# Patient Record
Sex: MALE | Race: OTHER | NOT HISPANIC OR LATINO | Employment: OTHER | URBAN - METROPOLITAN AREA
[De-identification: names, ages, dates, MRNs, and addresses within clinical notes are randomized per-mention and may not be internally consistent; named-entity substitution may affect disease eponyms.]

---

## 2017-06-23 DIAGNOSIS — I71.20 THORACIC AORTIC ANEURYSM WITHOUT RUPTURE (HCC): Primary | ICD-10-CM

## 2017-07-11 ENCOUNTER — HOSPITAL ENCOUNTER (OUTPATIENT)
Dept: CT IMAGING | Facility: HOSPITAL | Age: 62
Discharge: HOME | End: 2017-07-11
Admitting: PHYSICIAN ASSISTANT

## 2017-07-11 DIAGNOSIS — I71.20 THORACIC AORTIC ANEURYSM WITHOUT RUPTURE (HCC): ICD-10-CM

## 2017-07-11 LAB — CREAT BLDA-MCNC: 0.8 MG/DL (ref 0.6–1.3)

## 2017-07-11 PROCEDURE — 82565 ASSAY OF CREATININE: CPT

## 2017-07-11 PROCEDURE — 0 IOPAMIDOL PER 1 ML: Performed by: PHYSICIAN ASSISTANT

## 2017-07-11 PROCEDURE — 71270 CT THORAX DX C-/C+: CPT

## 2017-07-11 RX ADMIN — IOPAMIDOL 85 ML: 755 INJECTION, SOLUTION INTRAVENOUS at 10:35

## 2017-08-17 ENCOUNTER — OFFICE VISIT (OUTPATIENT)
Dept: CARDIAC SURGERY | Facility: CLINIC | Age: 62
End: 2017-08-17

## 2017-08-17 DIAGNOSIS — I71.20 THORACIC AORTIC ANEURYSM WITHOUT RUPTURE (HCC): Primary | ICD-10-CM

## 2017-08-17 PROCEDURE — 99212 OFFICE O/P EST SF 10 MIN: CPT | Performed by: THORACIC SURGERY (CARDIOTHORACIC VASCULAR SURGERY)

## 2017-08-18 VITALS
DIASTOLIC BLOOD PRESSURE: 70 MMHG | SYSTOLIC BLOOD PRESSURE: 119 MMHG | BODY MASS INDEX: 29.52 KG/M2 | HEIGHT: 74 IN | WEIGHT: 230 LBS

## 2017-08-18 RX ORDER — GABAPENTIN 800 MG/1
TABLET ORAL
Refills: 2 | COMMUNITY
Start: 2017-07-03 | End: 2021-06-24 | Stop reason: SDUPTHER

## 2017-08-18 RX ORDER — CICLESONIDE 50 UG/1
SPRAY NASAL
COMMUNITY
Start: 2017-08-02 | End: 2021-06-24

## 2017-08-18 RX ORDER — DILTIAZEM HYDROCHLORIDE 60 MG/1
1 TABLET, FILM COATED ORAL 2 TIMES DAILY
Status: ON HOLD | COMMUNITY
Start: 2017-08-02 | End: 2021-08-22

## 2017-08-18 NOTE — PROGRESS NOTES
08/17/2017  Patient Information  Wong Alicea                                                                                          [address]   [address] [address]   1955  'PCP/Referring Physician'  Juliann Aguilar, APRN  [phone]  No ref. provider found    Chief Complaint   Patient presents with   • Follow-up     1yr f/u w/ CT Chest       History of Present Illness:  Mr. Alicea returns today for follow up of his thoracic aneurysm.  He has been feeling well since last visit.  He has had no new problems.  He denies any chest pain or discomfort.    Patient Active Problem List   Diagnosis   • Thoracic aortic aneurysm without rupture     Past Medical History:   Diagnosis Date   • Aneurysm    • Arthritis    • Cataract    • Depression    • GERD (gastroesophageal reflux disease)    • Hypertension    • Renal cyst    • Thoracic aortic aneurysm      Past Surgical History:   Procedure Laterality Date   • CARPAL TUNNEL RELEASE     • CATARACT EXTRACTION     • CHOLECYSTECTOMY     • HAND SURGERY     • REPLACEMENT TOTAL KNEE     • SHOULDER SURGERY     • VEIN LACERATION REPAIR      OPEN LEFT KNEE SURGERY FOR VEIN REPAIR       Current Outpatient Prescriptions:   •  aspirin 81 MG tablet, Take  by mouth., Disp: , Rfl:   •  Budesonide-Formoterol Fumarate (SYMBICORT IN), Inhale., Disp: , Rfl:   •  clonazePAM (KlonoPIN) 1 MG tablet, Take  by mouth., Disp: , Rfl:   •  esomeprazole (NEXIUM) 40 MG capsule, Take  by mouth., Disp: , Rfl:   •  gabapentin (NEURONTIN) 800 MG tablet, , Disp: , Rfl: 2  •  levocetirizine (XYZAL) 5 MG tablet, Take  by mouth., Disp: , Rfl:   •  lisinopril (PRINIVIL,ZESTRIL) 10 MG tablet, Take 10 mg by mouth daily., Disp: , Rfl:   •  montelukast (SINGULAIR) 10 MG tablet, Take  by mouth., Disp: , Rfl:   •  OMNARIS 50 MCG/ACT nasal spray, , Disp: , Rfl:   •  SYMBICORT 80-4.5 MCG/ACT inhaler, , Disp: , Rfl:   •  tamsulosin (FLOMAX) 0.4 MG capsule 24 hr capsule, Take  by mouth., Disp: , Rfl:   •  gabapentin  (NEURONTIN) 400 MG capsule, Take  by mouth., Disp: , Rfl:   Allergies   Allergen Reactions   • Codeine    • Erythromycin    • Niacin    • Other    • Wheat Bran      Social History     Social History   • Marital status:      Spouse name: N/A   • Number of children: 3   • Years of education: N/A     Occupational History   •  Disabled     Social History Main Topics   • Smoking status: Former Smoker     Packs/day: 0.50     Years: 20.00     Types: Cigarettes     Quit date: 7/8/1998   • Smokeless tobacco: Former User   • Alcohol use No   • Drug use: No   • Sexual activity: Not on file     Other Topics Concern   • Not on file     Social History Narrative    Lives in [place], [place] with spouse     Family History   Problem Relation Age of Onset   • Heart failure Mother    • Coronary artery disease Father    • Lung disease Father      Review of Systems   Constitution: Positive for malaise/fatigue. Negative for chills, fever, night sweats and weight loss.   HENT: Positive for congestion and hearing loss. Negative for headaches, odynophagia and sore throat.    Cardiovascular: Positive for chest pain, dyspnea on exertion and palpitations. Negative for leg swelling and orthopnea.   Respiratory: Positive for shortness of breath. Negative for cough and hemoptysis.    Endocrine: Negative for cold intolerance, heat intolerance, polydipsia, polyphagia and polyuria.   Hematologic/Lymphatic: Does not bruise/bleed easily.   Skin: Negative for itching and rash.   Musculoskeletal: Positive for back pain, joint pain and muscle cramps. Negative for joint swelling and myalgias.   Gastrointestinal: Negative for abdominal pain, constipation, diarrhea, hematemesis, hematochezia, melena, nausea and vomiting.   Genitourinary: Negative for dysuria, frequency and hematuria.   Neurological: Positive for dizziness. Negative for focal weakness, numbness and seizures.   Psychiatric/Behavioral: Positive for depression. Negative for suicidal  "ideas.   All other systems reviewed and are negative.    Vitals:    08/18/17 0904   BP: 119/70   BP Location: Left arm   Patient Position: Sitting   Weight: 230 lb (104 kg)   Height: 74\" (188 cm)      Physical Exam  NECK:   No masses.  LUNGS:  Clear.  CARDIOVASCULAR: Regular rhythm and rate.  No carotid bruits.    Labs/Imaging:  His CT scan reveals his chest to be stable.    Assessment/Plan:  Mr. Alicea's thoracic aortic aneurysm appears stable by CT scan.  I have discussed the CT scan results with him.  We will see Mr. Alicea back in follow up in two years with another CT scan.      Patient Active Problem List   Diagnosis   • Thoracic aortic aneurysm without rupture     Signed by: Sher Martin M.D.    8/17/17    CC:  NICHELLE Nunez transcribing on behalf of Sher Martin MD dictating.   "

## 2019-08-29 ENCOUNTER — TELEPHONE (OUTPATIENT)
Dept: CARDIAC SURGERY | Facility: CLINIC | Age: 64
End: 2019-08-29

## 2019-12-19 ENCOUNTER — OFFICE VISIT (OUTPATIENT)
Dept: CARDIAC SURGERY | Facility: CLINIC | Age: 64
End: 2019-12-19

## 2019-12-19 DIAGNOSIS — I71.20 THORACIC AORTIC ANEURYSM WITHOUT RUPTURE (HCC): Primary | ICD-10-CM

## 2019-12-19 PROCEDURE — 99212 OFFICE O/P EST SF 10 MIN: CPT | Performed by: THORACIC SURGERY (CARDIOTHORACIC VASCULAR SURGERY)

## 2019-12-20 VITALS
DIASTOLIC BLOOD PRESSURE: 90 MMHG | WEIGHT: 228 LBS | BODY MASS INDEX: 29.26 KG/M2 | HEIGHT: 74 IN | OXYGEN SATURATION: 99 % | HEART RATE: 66 BPM | SYSTOLIC BLOOD PRESSURE: 138 MMHG

## 2019-12-20 RX ORDER — ROSUVASTATIN CALCIUM 20 MG/1
20 TABLET, COATED ORAL DAILY
Status: ON HOLD | COMMUNITY
Start: 2019-09-18 | End: 2021-08-22

## 2019-12-20 RX ORDER — TIZANIDINE 4 MG/1
4 TABLET ORAL 2 TIMES DAILY
COMMUNITY
Start: 2019-11-18

## 2019-12-20 RX ORDER — LIDOCAINE 50 MG/G
1 PATCH TOPICAL AS NEEDED
COMMUNITY
Start: 2019-11-12

## 2019-12-20 RX ORDER — METOPROLOL SUCCINATE 25 MG/1
25 TABLET, EXTENDED RELEASE ORAL DAILY
COMMUNITY
Start: 2019-11-18

## 2019-12-20 RX ORDER — SILDENAFIL 100 MG/1
100 TABLET, FILM COATED ORAL DAILY PRN
Status: ON HOLD | COMMUNITY
Start: 2019-09-18 | End: 2021-08-22

## 2019-12-20 RX ORDER — ERGOCALCIFEROL 1.25 MG/1
50000 CAPSULE ORAL WEEKLY
COMMUNITY
Start: 2019-11-18

## 2019-12-20 RX ORDER — PREGABALIN 100 MG/1
CAPSULE ORAL 3 TIMES DAILY
COMMUNITY
Start: 2019-12-10 | End: 2021-08-09

## 2019-12-20 RX ORDER — BUPROPION HYDROCHLORIDE 150 MG/1
150 TABLET ORAL DAILY
COMMUNITY
Start: 2019-11-18

## 2019-12-20 RX ORDER — ROPINIROLE 1 MG/1
1 TABLET, FILM COATED ORAL NIGHTLY
COMMUNITY
End: 2021-06-24

## 2019-12-20 NOTE — PROGRESS NOTES
12/19/2019  Patient Information  Wong Alicea                                                                                          [address]   [address] [address]   1955  'PCP/Referring Physician'  Juliann Aguilar, APRN  [phone]  No ref. provider found    Chief Complaint   Patient presents with   • Follow-up     2 YR FU with CT Chest-Ascending Aneursym       History of Present Illness:  Mr. Alicea returns today for follow up of his ascending thoracic aneurysm.  Since last visit, he has been doing approximately the same.  He had a CT done which reveals a lot of interstitial lung disease and plaque.  He has an ascending aneurysm that is approximately 4.4 cm.  He denies any chest pain or discomfort.  He does have some shortness of breath.  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    Patient Active Problem List   Diagnosis   • Thoracic aortic aneurysm without rupture (CMS/HCC)     Past Medical History:   Diagnosis Date   • Aneurysm (CMS/HCC)    • Arthritis    • Cataract    • Depression    • GERD (gastroesophageal reflux disease)    • Hypertension    • Renal cyst    • Thoracic aortic aneurysm (CMS/HCC)      Past Surgical History:   Procedure Laterality Date   • CARPAL TUNNEL RELEASE     • CATARACT EXTRACTION     • CHOLECYSTECTOMY     • HAND SURGERY     • REPLACEMENT TOTAL KNEE     • SHOULDER SURGERY     • VEIN LACERATION REPAIR      OPEN LEFT KNEE SURGERY FOR VEIN REPAIR       Current Outpatient Medications:   •  aspirin 81 MG tablet, Take  by mouth., Disp: , Rfl:   •  buPROPion XL (WELLBUTRIN XL) 150 MG 24 hr tablet, Daily., Disp: , Rfl:   •  diclofenac (VOLTAREN) 1 % gel gel, Apply 4 g topically to the appropriate area as directed Every 12 (Twelve) Hours As Needed., Disp: , Rfl:   •  levocetirizine (XYZAL) 5 MG tablet, Take  by mouth Daily., Disp: , Rfl:   •  lidocaine (LIDODERM) 5 %, As Needed., Disp: , Rfl:   •  metoprolol succinate XL (TOPROL-XL) 25 MG 24 hr tablet, Daily., Disp: , Rfl:   •   montelukast (SINGULAIR) 10 MG tablet, Take  by mouth Every Night., Disp: , Rfl:   •  OMNARIS 50 MCG/ACT nasal spray, , Disp: , Rfl:   •  pregabalin (LYRICA) 100 MG capsule, 3 (Three) Times a Day., Disp: , Rfl:   •  rOPINIRole (REQUIP) 1 MG tablet, Take 1 mg by mouth Every Night. Take 1 hour before bedtime., Disp: , Rfl:   •  rosuvastatin (CRESTOR) 20 MG tablet, Take 20 mg by mouth Daily., Disp: , Rfl:   •  sildenafil (VIAGRA) 100 MG tablet, Take 100 mg by mouth Daily As Needed., Disp: , Rfl:   •  SYMBICORT 80-4.5 MCG/ACT inhaler, Daily., Disp: , Rfl:   •  tiZANidine (ZANAFLEX) 4 MG tablet, 4 (Four) Times a Day., Disp: , Rfl:   •  vitamin D (ERGOCALCIFEROL) 1.25 MG (44734 UT) capsule capsule, 1 (One) Time Per Week., Disp: , Rfl:   •  Budesonide-Formoterol Fumarate (SYMBICORT IN), Inhale., Disp: , Rfl:   •  clonazePAM (KlonoPIN) 1 MG tablet, Take  by mouth., Disp: , Rfl:   •  esomeprazole (NEXIUM) 40 MG capsule, Take  by mouth., Disp: , Rfl:   •  gabapentin (NEURONTIN) 400 MG capsule, Take  by mouth., Disp: , Rfl:   •  gabapentin (NEURONTIN) 800 MG tablet, , Disp: , Rfl: 2  •  lisinopril (PRINIVIL,ZESTRIL) 10 MG tablet, Take 10 mg by mouth daily., Disp: , Rfl:   •  tamsulosin (FLOMAX) 0.4 MG capsule 24 hr capsule, Take  by mouth., Disp: , Rfl:   Allergies   Allergen Reactions   • Other    • Wheat Bran    • Codeine Anxiety   • Erythromycin Rash   • Niacin Rash   • Sulfa Antibiotics Rash     Social History     Socioeconomic History   • Marital status:      Spouse name: Not on file   • Number of children: 3   • Years of education: Not on file   • Highest education level: Not on file   Occupational History   • Occupation:      Employer: DISABLED     Comment: Joint [employer]   Tobacco Use   • Smoking status: Former Smoker     Packs/day: 0.50     Years: 20.00     Pack years: 10.00     Types: Cigarettes     Last attempt to quit: 1998     Years since quittin.4   • Smokeless tobacco: Former User      "Types: Chew     Quit date: 1998   Substance and Sexual Activity   • Alcohol use: No   • Drug use: No   Social History Narrative    Lives in [place], [place] with spouse     Family History   Problem Relation Age of Onset   • Heart failure Mother    • Coronary artery disease Father    • Lung disease Father      Review of Systems   Constitution: Positive for night sweats and weight loss (by diet). Negative for chills, fever and malaise/fatigue.   HENT: Negative for hearing loss, odynophagia and sore throat.    Cardiovascular: Positive for chest pain, claudication (Bilateral), dyspnea on exertion and leg swelling. Negative for orthopnea and palpitations.   Respiratory: Positive for shortness of breath and wheezing. Negative for cough and hemoptysis.    Endocrine: Negative for cold intolerance, heat intolerance, polydipsia, polyphagia and polyuria.   Hematologic/Lymphatic: Bruises/bleeds easily.   Skin: Negative for itching and rash.   Musculoskeletal: Positive for back pain and joint pain. Negative for joint swelling and myalgias.   Gastrointestinal: Positive for hematochezia and hemorrhoids. Negative for abdominal pain, constipation, diarrhea, hematemesis, melena, nausea and vomiting.   Genitourinary: Negative for dysuria, frequency and hematuria.   Neurological: Positive for dizziness and light-headedness. Negative for focal weakness, headaches, numbness and seizures.   Psychiatric/Behavioral: Negative for suicidal ideas.   All other systems reviewed and are negative.    Vitals:    12/19/19 0856   BP: 138/90   BP Location: Left arm   Patient Position: Sitting   Pulse: 66   SpO2: 99%   Weight: 103 kg (228 lb)   Height: 188 cm (74\")      Physical Exam  NECK:    No masses.  LUNGS:   Decreased in the bases.  CARDIOVASCULAR:  Regular rhythm and rate.  GI:    Abdomen is soft.    Labs/Imaging:  I have obtained and reviewed the CT scan.    Assessment/Plan:  Mr. Alicea returns for follow up of his ascending thoracic aneurysm.  At this " point, I think that he probably has coal workers' pneumoconiosis from his mine work.  He also has an aneurysm which appears to be stable.  He has no chest pain.  I emphasized the importance of good blood pressure control.  He is not smoking.  I will see him back in one year with a CT scan.    Patient Active Problem List   Diagnosis   • Thoracic aortic aneurysm without rupture (CMS/[facility])     CC: NICHELLE Nunez transcribing on behalf of Sher Martin MD dictating.

## 2021-03-23 DIAGNOSIS — I71.20 THORACIC AORTIC ANEURYSM WITHOUT RUPTURE (HCC): Primary | ICD-10-CM

## 2021-04-12 ENCOUNTER — TELEPHONE (OUTPATIENT)
Dept: CARDIAC SURGERY | Facility: CLINIC | Age: 66
End: 2021-04-12

## 2021-04-12 NOTE — TELEPHONE ENCOUNTER
This pt called to f/u on his test as he has not heard anything about when it is scheduled. He will be in [place] on 5/12 for another doctor's appointment and would like his test to be here at [facility] on that day if possible. I told him you would send the order to central scheduling and they will be calling him to set up a time.

## 2021-05-12 ENCOUNTER — TRANSCRIBE ORDERS (OUTPATIENT)
Dept: ADMINISTRATIVE | Facility: HOSPITAL | Age: 66
End: 2021-05-12

## 2021-05-12 DIAGNOSIS — K21.9 CHRONIC GERD: Primary | ICD-10-CM

## 2021-06-09 ENCOUNTER — APPOINTMENT (OUTPATIENT)
Dept: PREADMISSION TESTING | Facility: HOSPITAL | Age: 66
End: 2021-06-09

## 2021-06-11 PROCEDURE — 88305 TISSUE EXAM BY PATHOLOGIST: CPT | Performed by: SURGERY

## 2021-06-14 ENCOUNTER — LAB REQUISITION (OUTPATIENT)
Dept: LAB | Facility: HOSPITAL | Age: 66
End: 2021-06-14

## 2021-06-14 DIAGNOSIS — K21.9 GASTRO-ESOPHAGEAL REFLUX DISEASE WITHOUT ESOPHAGITIS: ICD-10-CM

## 2021-06-15 ENCOUNTER — APPOINTMENT (OUTPATIENT)
Dept: PREADMISSION TESTING | Facility: HOSPITAL | Age: 66
End: 2021-06-15

## 2021-06-15 LAB
CYTO UR: NORMAL
LAB AP CASE REPORT: NORMAL
LAB AP CLINICAL INFORMATION: NORMAL
PATH REPORT.FINAL DX SPEC: NORMAL
PATH REPORT.GROSS SPEC: NORMAL

## 2021-06-16 DIAGNOSIS — Z00.6 EXAMINATION FOR NORMAL COMPARISON FOR CLINICAL RESEARCH: Primary | ICD-10-CM

## 2021-06-17 ENCOUNTER — APPOINTMENT (OUTPATIENT)
Dept: GENERAL RADIOLOGY | Facility: HOSPITAL | Age: 66
End: 2021-06-17

## 2021-06-18 ENCOUNTER — HOSPITAL ENCOUNTER (OUTPATIENT)
Facility: HOSPITAL | Age: 66
Setting detail: HOSPITAL OUTPATIENT SURGERY
Discharge: HOME | End: 2021-06-18
Attending: SURGERY | Admitting: SURGERY

## 2021-06-18 ENCOUNTER — HOSPITAL ENCOUNTER (OUTPATIENT)
Dept: GENERAL RADIOLOGY | Facility: HOSPITAL | Age: 66
Discharge: HOME | End: 2021-06-18

## 2021-06-18 DIAGNOSIS — K21.9 CHRONIC GERD: ICD-10-CM

## 2021-06-18 PROCEDURE — 74220 X-RAY XM ESOPHAGUS 1CNTRST: CPT

## 2021-06-18 PROCEDURE — 91010 ESOPHAGUS MOTILITY STUDY: CPT | Performed by: SURGERY

## 2021-06-18 PROCEDURE — 91034 GASTROESOPHAGEAL REFLUX TEST: CPT | Performed by: SURGERY

## 2021-06-18 RX ADMIN — BARIUM SULFATE 183 ML: 960 POWDER, FOR SUSPENSION ORAL at 11:37

## 2021-06-22 ENCOUNTER — TRANSCRIBE ORDERS (OUTPATIENT)
Dept: ADMINISTRATIVE | Facility: HOSPITAL | Age: 66
End: 2021-06-22

## 2021-06-22 DIAGNOSIS — K21.9 CHRONIC GERD: Primary | ICD-10-CM

## 2021-06-24 ENCOUNTER — OFFICE VISIT (OUTPATIENT)
Dept: CARDIAC SURGERY | Facility: CLINIC | Age: 66
End: 2021-06-24

## 2021-06-24 VITALS
BODY MASS INDEX: 29.95 KG/M2 | TEMPERATURE: 97.1 F | OXYGEN SATURATION: 98 % | DIASTOLIC BLOOD PRESSURE: 85 MMHG | WEIGHT: 226 LBS | HEIGHT: 73 IN | HEART RATE: 65 BPM | SYSTOLIC BLOOD PRESSURE: 124 MMHG

## 2021-06-24 DIAGNOSIS — I71.20 THORACIC AORTIC ANEURYSM WITHOUT RUPTURE (HCC): Primary | ICD-10-CM

## 2021-06-24 PROBLEM — J84.9 INTERSTITIAL LUNG DISEASE (HCC): Status: ACTIVE | Noted: 2021-06-24

## 2021-06-24 PROCEDURE — 99213 OFFICE O/P EST LOW 20 MIN: CPT | Performed by: NURSE PRACTITIONER

## 2021-06-24 RX ORDER — HYDROCHLOROTHIAZIDE 12.5 MG/1
12.5 TABLET ORAL EVERY MORNING
COMMUNITY
Start: 2021-05-25

## 2021-06-24 RX ORDER — DICLOFENAC SODIUM 75 MG/1
75 TABLET, DELAYED RELEASE ORAL 2 TIMES DAILY
COMMUNITY
Start: 2021-05-25

## 2021-06-24 RX ORDER — GABAPENTIN 600 MG/1
600 TABLET ORAL 3 TIMES DAILY
COMMUNITY
Start: 2021-05-28

## 2021-06-24 RX ORDER — FAMOTIDINE 40 MG/1
40 TABLET, FILM COATED ORAL NIGHTLY PRN
Status: ON HOLD | COMMUNITY
Start: 2021-05-25 | End: 2021-08-22

## 2021-06-24 RX ORDER — CALCIUM CARBONATE 300MG(750)
400 TABLET,CHEWABLE ORAL DAILY
COMMUNITY
Start: 2021-05-25

## 2021-06-24 RX ORDER — ROPINIROLE 5 MG/1
5 TABLET, FILM COATED ORAL 2 TIMES DAILY
COMMUNITY
Start: 2021-05-17

## 2021-06-24 RX ORDER — EZETIMIBE 10 MG/1
10 TABLET ORAL DAILY
COMMUNITY
Start: 2021-05-17

## 2021-06-24 RX ORDER — AMITRIPTYLINE HYDROCHLORIDE 25 MG/1
25 TABLET, FILM COATED ORAL
COMMUNITY
Start: 2021-05-25

## 2021-06-24 NOTE — PROGRESS NOTES
[facility] Cardiothoracic Surgery Office Follow Up Note     Date of Encounter: 06/24/2021     MRN Number: [id number]  Name: Wong Alicea  Phone Number: [phone]     Referred By: No ref. provider found  PCP: Juliann Aguilar APRN    Chief Complaint:    Chief Complaint   Patient presents with   • Follow-up     1 year follow up for an ascending aortic aneurysm.   • Thoracic Aneurysm       History of Present Illness:    Wong Alicea is a 66 y.o. male with a history of ongoing tob use, HLP, interstitial lung disease (long time ), and ascending thoracic aneurysm.  Patient presents today in follow-up of his interstitial lung disease and ascending aortic aneurysm.  Last seen in the office on 12/19/2019 with Dr. Martin.  Since last office visit, patient has been having difficulty with reflux with chest pain into between his shoulder blades.  He did undergo cardiac workup with stress testing with Dr. Cheney which was negative per pt report.  Further work-up with Dr. Mcgee reveals a hiatal hernia.  Plans for repair in the next few weeks.  He denies any unusual chest pain, back pain or flank pain.  Blood pressures have been stable.    Review of Systems:  Review of Systems   Constitutional: Negative. Negative for chills, decreased appetite, diaphoresis, fever, malaise/fatigue, night sweats and weight loss.   HENT: Positive for hoarse voice. Negative for congestion, sore throat and stridor.    Cardiovascular: Positive for dyspnea on exertion, irregular heartbeat (feels a flutter sometimes) and leg swelling. Negative for chest pain, claudication, near-syncope, orthopnea, palpitations, paroxysmal nocturnal dyspnea and syncope.   Respiratory: Positive for shortness of breath. Negative for cough, hemoptysis, sleep disturbances due to breathing, snoring, sputum production and wheezing.    Hematologic/Lymphatic: Negative for adenopathy and bleeding problem. Does not bruise/bleed easily.   Skin: Negative.   Negative for color change, dry skin, itching, poor wound healing and rash.   Musculoskeletal: Positive for back pain, falls, joint pain and muscle cramps. Negative for arthritis and muscle weakness.   Gastrointestinal: Positive for dysphagia and heartburn. Negative for abdominal pain, anorexia, constipation, diarrhea, hematochezia, melena, nausea and vomiting.   Genitourinary: Negative.    Neurological: Positive for dizziness, light-headedness and loss of balance. Negative for difficulty with concentration, disturbances in coordination, numbness, seizures, vertigo and weakness.   Psychiatric/Behavioral: Positive for depression. Negative for altered mental status, memory loss and substance abuse. The patient is nervous/anxious. The patient does not have insomnia.    Allergic/Immunologic: Negative for persistent infections.       Allergies:  Allergies   Allergen Reactions   • Other    • Codeine Anxiety   • Erythromycin Rash   • Niacin Rash   • Sulfa Antibiotics Rash   • Wheat Bran Rash       Medications:      Current Outpatient Medications:   •  amitriptyline (ELAVIL) 25 MG tablet, Take 25 mg by mouth every night at bedtime., Disp: , Rfl:   •  aspirin 81 MG tablet, Take  by mouth., Disp: , Rfl:   •  Budesonide-Formoterol Fumarate (SYMBICORT IN), Inhale., Disp: , Rfl:   •  buPROPion XL (WELLBUTRIN XL) 150 MG 24 hr tablet, Daily., Disp: , Rfl:   •  diclofenac (VOLTAREN) 75 MG EC tablet, Take 75 mg by mouth 2 (Two) Times a Day., Disp: , Rfl:   •  ezetimibe (ZETIA) 10 MG tablet, Take 10 mg by mouth Daily., Disp: , Rfl:   •  famotidine (PEPCID) 40 MG tablet, Take 40 mg by mouth every night at bedtime., Disp: , Rfl:   •  gabapentin (NEURONTIN) 600 MG tablet, Take 600 mg by mouth 3 (Three) Times a Day., Disp: , Rfl:   •  hydroCHLOROthiazide (HYDRODIURIL) 12.5 MG tablet, Take 12.5 mg by mouth Every Morning., Disp: , Rfl:   •  lidocaine (LIDODERM) 5 %, As Needed., Disp: , Rfl:   •  Magnesium 400 MG tablet, Take 400 mg by mouth  Daily., Disp: , Rfl:   •  metoprolol succinate XL (TOPROL-XL) 25 MG 24 hr tablet, Daily., Disp: , Rfl:   •  pregabalin (LYRICA) 100 MG capsule, 3 (Three) Times a Day., Disp: , Rfl:   •  rOPINIRole (REQUIP) 5 MG tablet, Take 5 mg by mouth 2 (Two) Times a Day., Disp: , Rfl:   •  rosuvastatin (CRESTOR) 20 MG tablet, Take 20 mg by mouth Daily., Disp: , Rfl:   •  sildenafil (VIAGRA) 100 MG tablet, Take 100 mg by mouth Daily As Needed., Disp: , Rfl:   •  SYMBICORT 80-4.5 MCG/ACT inhaler, Daily., Disp: , Rfl:   •  tiZANidine (ZANAFLEX) 4 MG tablet, 4 (Four) Times a Day., Disp: , Rfl:   •  vitamin D (ERGOCALCIFEROL) 1.25 MG (03976 UT) capsule capsule, 1 (One) Time Per Week., Disp: , Rfl:   •  lisinopril (PRINIVIL,ZESTRIL) 10 MG tablet, Take 10 mg by mouth daily., Disp: , Rfl:     Social History     Socioeconomic History   • Marital status:      Spouse name: Not on file   • Number of children: 3   • Years of education: Not on file   • Highest education level: Not on file   Tobacco Use   • Smoking status: Current Every Day Smoker     Packs/day: 0.50     Years: 20.00     Pack years: 10.00     Types: Cigarettes, Cigars   • Smokeless tobacco: Former User     Types: Chew     Quit date: 1998   • Tobacco comment: smoking 8 cigars a day   Substance and Sexual Activity   • Alcohol use: No   • Drug use: No       Family History   Problem Relation Age of Onset   • Heart failure Mother    • Coronary artery disease Father    • Lung disease Father        Past Medical History:   Diagnosis Date   • Aneurysm (CMS/HCC)    • Arthritis    • Cataract    • Depression    • GERD (gastroesophageal reflux disease)    • Hiatal hernia    • Hypertension    • Renal cyst    • Thoracic aortic aneurysm (CMS/HCC)        Past Surgical History:   Procedure Laterality Date   • CARPAL TUNNEL RELEASE     • CATARACT EXTRACTION     • CHOLECYSTECTOMY     • ESOPHAGEAL MOTILITY STUDY N/A 6/18/2021    Procedure: MANOMETRY/PH;  Surgeon: Ralf Mcgee MD;   "Location:  [facility];  Service: General;  Laterality: N/A;  procedure completed successfully.   • GASTRIC PH MONITORING 24HR N/A 6/18/2021    Procedure: PH;  Surgeon: Ralf Mcgee MD;  Location:  [facility];  Service: General;  Laterality: N/A;  Probe passed successfully; secured @ 34 cm. LES @ 43.8 cm.   • HAND SURGERY     • REPLACEMENT TOTAL KNEE     • SHOULDER SURGERY     • VEIN LACERATION REPAIR      OPEN LEFT KNEE SURGERY FOR VEIN REPAIR       I have reviewed the following portions of the patient's history: allergies, current medications, past family history, past medical history, past social history, past surgical history and problem list and confirm it's accurate.    Physical Exam:  Vital Signs:    Vitals:    06/24/21 1402   BP: 124/85   BP Location: Right arm   Patient Position: Sitting   Pulse: 65   Temp: 97.1 °F (36.2 °C)   SpO2: 98%   Weight: 103 kg (226 lb)   Height: 185.4 cm (73\")     Body mass index is 29.82 kg/m².     Physical Exam  Vitals and nursing note reviewed.   Constitutional:       Appearance: Normal appearance. He is well-developed and well-groomed.   HENT:      Head: Normocephalic and atraumatic.   Cardiovascular:      Rate and Rhythm: Normal rate and regular rhythm.      Heart sounds: Normal heart sounds, S1 normal and S2 normal. No murmur heard.   No friction rub.   Pulmonary:      Comments: Unlabored, Coarse to auscultation bilaterally  Abdominal:      General: Bowel sounds are normal.      Palpations: Abdomen is soft.      Tenderness: There is no abdominal tenderness.   Musculoskeletal:      Cervical back: Neck supple.      Right lower leg: No edema.      Left lower leg: No edema.   Skin:     General: Skin is warm and dry.   Neurological:      Mental Status: He is alert and oriented to person, place, and time.   Psychiatric:         Attention and Perception: Attention normal.         Mood and Affect: Mood normal.         Speech: Speech normal.         Behavior: Behavior " is cooperative.         Labs/Imaging:    FL Esophagram Complete Single Contrast    Result Date: 6/18/2021  1. Moderate gastroesophageal reflux to the level of the thoracic inlet 2. Mild esophageal dysmotility 3. Small sized sliding-type hiatal hernia    This report was finalized on 6/18/2021 3:47 PM by Florian Gregg.       CT chest [facility]:  4.3 cm ascending thoracic aortic aneurysm.  Chronic interstitial and pleural-based nodular densities predominantly within the mid and upper lung zones minimally progressed when compared to prior exams.  Personally reviewed.    Assessment / Plan:  Diagnoses and all orders for this visit:    1. Thoracic aortic aneurysm without rupture (CMS/HCC) (Primary)       Wong Alicea is a 66 y.o. male with a history of ongoing tob use, HLP, interstitial lung disease (long time ), and ascending thoracic aneurysm.  Discussed findings of CT chest with stable 4.3 cm ascending thoracic aortic aneurysm and chronic interstitial and pleural-based nodular densities.  Unfortunately, patient is smoking again.   Plans to undergo hiatal hernia repair with Dr. Mcgee in the next few weeks.  We will be plan to follow up in 1 year with repeat CT chest with and with contrast.       NICHELLE Pierce  [facility] Cardiothoracic Surgery

## 2021-06-25 ENCOUNTER — HOSPITAL ENCOUNTER (OUTPATIENT)
Dept: NUCLEAR MEDICINE | Facility: HOSPITAL | Age: 66
Discharge: HOME | End: 2021-06-25

## 2021-06-25 DIAGNOSIS — K21.9 CHRONIC GERD: ICD-10-CM

## 2021-06-25 PROCEDURE — 0 TECHNETIUM SULFUR COLLOID: Performed by: SURGERY

## 2021-06-25 PROCEDURE — A9541 TC99M SULFUR COLLOID: HCPCS | Performed by: SURGERY

## 2021-06-25 PROCEDURE — 78264 GASTRIC EMPTYING IMG STUDY: CPT

## 2021-06-25 RX ADMIN — TECHNETIUM TC 99M SULFUR COLLOID 1 DOSE: KIT at 08:50

## 2021-07-28 ENCOUNTER — APPOINTMENT (OUTPATIENT)
Dept: NUCLEAR MEDICINE | Facility: HOSPITAL | Age: 66
End: 2021-07-28

## 2021-08-09 ENCOUNTER — ANESTHESIA EVENT (OUTPATIENT)
Dept: PERIOP | Facility: HOSPITAL | Age: 66
End: 2021-08-09

## 2021-08-09 ENCOUNTER — HOSPITAL ENCOUNTER (OUTPATIENT)
Dept: GENERAL RADIOLOGY | Facility: HOSPITAL | Age: 66
Discharge: HOME | End: 2021-08-09

## 2021-08-09 ENCOUNTER — PRE-ADMISSION TESTING (OUTPATIENT)
Dept: PREADMISSION TESTING | Facility: HOSPITAL | Age: 66
End: 2021-08-09

## 2021-08-09 VITALS — BODY MASS INDEX: 29.28 KG/M2 | HEIGHT: 74 IN | WEIGHT: 228.18 LBS

## 2021-08-09 LAB
ANION GAP SERPL CALCULATED.3IONS-SCNC: 11 MMOL/L (ref 5–15)
BUN SERPL-MCNC: 17 MG/DL (ref 8–23)
BUN/CREAT SERPL: 17.7 (ref 7–25)
CALCIUM SPEC-SCNC: 9.4 MG/DL (ref 8.6–10.5)
CHLORIDE SERPL-SCNC: 103 MMOL/L (ref 98–107)
CO2 SERPL-SCNC: 26 MMOL/L (ref 22–29)
CREAT SERPL-MCNC: 0.96 MG/DL (ref 0.76–1.27)
DEPRECATED RDW RBC AUTO: 44.8 FL (ref 37–54)
ERYTHROCYTE [DISTWIDTH] IN BLOOD BY AUTOMATED COUNT: 13.2 % (ref 12.3–15.4)
GFR SERPL CREATININE-BSD FRML MDRD: 79 ML/MIN/1.73
GFR SERPL CREATININE-BSD FRML MDRD: 96 ML/MIN/1.73
GLUCOSE SERPL-MCNC: 106 MG/DL (ref 65–99)
HCT VFR BLD AUTO: 45.7 % (ref 37.5–51)
HGB BLD-MCNC: 15.6 G/DL (ref 13–17.7)
MCH RBC QN AUTO: 31.8 PG (ref 26.6–33)
MCHC RBC AUTO-ENTMCNC: 34.1 G/DL (ref 31.5–35.7)
MCV RBC AUTO: 93.3 FL (ref 79–97)
PLATELET # BLD AUTO: 287 10*3/MM3 (ref 140–450)
PMV BLD AUTO: 10.4 FL (ref 6–12)
POTASSIUM SERPL-SCNC: 4.4 MMOL/L (ref 3.5–5.2)
QT INTERVAL: 402 MS
QTC INTERVAL: 411 MS
RBC # BLD AUTO: 4.9 10*6/MM3 (ref 4.14–5.8)
SARS-COV-2 RNA RESP QL NAA+PROBE: NOT DETECTED
SODIUM SERPL-SCNC: 140 MMOL/L (ref 136–145)
WBC # BLD AUTO: 7.79 10*3/MM3 (ref 3.4–10.8)

## 2021-08-09 PROCEDURE — 93005 ELECTROCARDIOGRAM TRACING: CPT

## 2021-08-09 PROCEDURE — 36415 COLL VENOUS BLD VENIPUNCTURE: CPT

## 2021-08-09 PROCEDURE — 93010 ELECTROCARDIOGRAM REPORT: CPT | Performed by: INTERNAL MEDICINE

## 2021-08-09 PROCEDURE — 71046 X-RAY EXAM CHEST 2 VIEWS: CPT

## 2021-08-09 PROCEDURE — 85027 COMPLETE CBC AUTOMATED: CPT

## 2021-08-09 PROCEDURE — C9803 HOPD COVID-19 SPEC COLLECT: HCPCS

## 2021-08-09 PROCEDURE — 80048 BASIC METABOLIC PNL TOTAL CA: CPT

## 2021-08-09 PROCEDURE — U0003 INFECTIOUS AGENT DETECTION BY NUCLEIC ACID (DNA OR RNA); SEVERE ACUTE RESPIRATORY SYNDROME CORONAVIRUS 2 (SARS-COV-2) (CORONAVIRUS DISEASE [COVID-19]), AMPLIFIED PROBE TECHNIQUE, MAKING USE OF HIGH THROUGHPUT TECHNOLOGIES AS DESCRIBED BY CMS-2020-01-R: HCPCS

## 2021-08-09 RX ORDER — FAMOTIDINE 20 MG/1
20 TABLET, FILM COATED ORAL ONCE
Status: CANCELLED | OUTPATIENT
Start: 2021-08-09 | End: 2021-08-09

## 2021-08-09 NOTE — PAT
Patient to apply Chlorhexadine wipes  to surgical area (as instructed) the night before procedure and the AM of procedure. Wipes provided.  Per Anesthesia Request, patient instructed not to take their ACE/ARB medications on the AM of surgery.  Patient directed to Radiology Department for CXR after Pre Admission Testing Appointment.   Kamla from Dr. Mcgee's office contacted patient during his PAT visit to give him his arrival time-spoke with Kamla during the call about pt is currently on Flagyl for a yeast infection in his groin area.  Kamla stated she will let Dr. Mcgee know and will contact pt with answer.

## 2021-08-10 ENCOUNTER — APPOINTMENT (OUTPATIENT)
Dept: GENERAL RADIOLOGY | Facility: HOSPITAL | Age: 66
End: 2021-08-10

## 2021-08-10 ENCOUNTER — HOSPITAL ENCOUNTER (OUTPATIENT)
Facility: HOSPITAL | Age: 66
Discharge: HOME | End: 2021-08-11
Attending: SURGERY | Admitting: SURGERY

## 2021-08-10 ENCOUNTER — ANESTHESIA (OUTPATIENT)
Dept: PERIOP | Facility: HOSPITAL | Age: 66
End: 2021-08-10

## 2021-08-10 DIAGNOSIS — K44.9 PARAESOPHAGEAL HERNIA: ICD-10-CM

## 2021-08-10 DIAGNOSIS — K21.00 GASTROESOPHAGEAL REFLUX DISEASE WITH ESOPHAGITIS WITHOUT HEMORRHAGE: Primary | ICD-10-CM

## 2021-08-10 PROCEDURE — 25010000002 ONDANSETRON PER 1 MG: Performed by: NURSE ANESTHETIST, CERTIFIED REGISTERED

## 2021-08-10 PROCEDURE — A9270 NON-COVERED ITEM OR SERVICE: HCPCS | Performed by: SURGERY

## 2021-08-10 PROCEDURE — 25010000002 NEOSTIGMINE 10 MG/10ML SOLUTION: Performed by: NURSE ANESTHETIST, CERTIFIED REGISTERED

## 2021-08-10 PROCEDURE — 63710000001 GABAPENTIN 300 MG CAPSULE: Performed by: SURGERY

## 2021-08-10 PROCEDURE — 63710000001 LISINOPRIL 10 MG TABLET: Performed by: SURGERY

## 2021-08-10 PROCEDURE — 25010000003 CEFAZOLIN IN DEXTROSE 2-4 GM/100ML-% SOLUTION: Performed by: SURGERY

## 2021-08-10 PROCEDURE — 25010000002 HYDROMORPHONE HCL-NACL 30-0.9 MG/30ML-% SOLUTION PREFILLED SYRINGE: Performed by: SURGERY

## 2021-08-10 PROCEDURE — 25010000002 FENTANYL CITRATE (PF) 50 MCG/ML SOLUTION: Performed by: NURSE ANESTHETIST, CERTIFIED REGISTERED

## 2021-08-10 PROCEDURE — 63710000001 MONTELUKAST 10 MG TABLET: Performed by: SURGERY

## 2021-08-10 PROCEDURE — 94640 AIRWAY INHALATION TREATMENT: CPT

## 2021-08-10 PROCEDURE — 25010000002 SUCCINYLCHOLINE PER 20 MG: Performed by: NURSE ANESTHETIST, CERTIFIED REGISTERED

## 2021-08-10 PROCEDURE — 63710000001 BUDESONIDE-FORMOTEROL 80-4.5 MCG/ACT AEROSOL 6.9 G INHALER: Performed by: SURGERY

## 2021-08-10 PROCEDURE — 25010000002 PROPOFOL 10 MG/ML EMULSION: Performed by: NURSE ANESTHETIST, CERTIFIED REGISTERED

## 2021-08-10 PROCEDURE — 25010000002 DEXAMETHASONE PER 1 MG: Performed by: NURSE ANESTHETIST, CERTIFIED REGISTERED

## 2021-08-10 PROCEDURE — 63710000001 AMITRIPTYLINE 25 MG TABLET: Performed by: SURGERY

## 2021-08-10 PROCEDURE — 63710000001 METRONIDAZOLE 500 MG TABLET: Performed by: SURGERY

## 2021-08-10 PROCEDURE — 63710000001 BUPROPION XL 150 MG TABLET SUSTAINED-RELEASE 24 HOUR: Performed by: SURGERY

## 2021-08-10 PROCEDURE — 63710000001 DOCUSATE SODIUM 150 MG/15ML LIQUID: Performed by: SURGERY

## 2021-08-10 PROCEDURE — G0378 HOSPITAL OBSERVATION PER HR: HCPCS

## 2021-08-10 PROCEDURE — 63710000001 HYDROCODONE-ACETAMINOPHEN 7.5-325 MG/15ML SOLUTION: Performed by: SURGERY

## 2021-08-10 PROCEDURE — 63710000001 NAPROXEN 250 MG TABLET: Performed by: SURGERY

## 2021-08-10 PROCEDURE — 88302 TISSUE EXAM BY PATHOLOGIST: CPT | Performed by: SURGERY

## 2021-08-10 PROCEDURE — 63710000001 SIMETHICONE 80 MG CHEWABLE TABLET: Performed by: SURGERY

## 2021-08-10 PROCEDURE — 71045 X-RAY EXAM CHEST 1 VIEW: CPT

## 2021-08-10 PROCEDURE — 94799 UNLISTED PULMONARY SVC/PX: CPT

## 2021-08-10 PROCEDURE — 63710000001 TIZANIDINE 4 MG TABLET: Performed by: SURGERY

## 2021-08-10 PROCEDURE — C1781 MESH (IMPLANTABLE): HCPCS | Performed by: SURGERY

## 2021-08-10 PROCEDURE — C1889 IMPLANT/INSERT DEVICE, NOC: HCPCS | Performed by: SURGERY

## 2021-08-10 PROCEDURE — 63710000001 ROPINIROLE 2 MG TABLET: Performed by: SURGERY

## 2021-08-10 PROCEDURE — 63710000001 ROPINIROLE 1 MG TABLET: Performed by: SURGERY

## 2021-08-10 DEVICE — PHASIX ST MESH, RECTANGLE
Type: IMPLANTABLE DEVICE | Site: ABDOMEN | Status: FUNCTIONAL
Brand: PHASIX ST MESH

## 2021-08-10 DEVICE — PHASIX ST MESH, RECTANGLE
Type: IMPLANTABLE DEVICE | Status: FUNCTIONAL
Brand: PHASIX ST MESH

## 2021-08-10 RX ORDER — METRONIDAZOLE 500 MG/1
500 TABLET ORAL EVERY 8 HOURS SCHEDULED
Status: DISCONTINUED | OUTPATIENT
Start: 2021-08-10 | End: 2021-08-11 | Stop reason: HOSPADM

## 2021-08-10 RX ORDER — LIDOCAINE HYDROCHLORIDE 10 MG/ML
INJECTION, SOLUTION EPIDURAL; INFILTRATION; INTRACAUDAL; PERINEURAL AS NEEDED
Status: DISCONTINUED | OUTPATIENT
Start: 2021-08-10 | End: 2021-08-10 | Stop reason: SURG

## 2021-08-10 RX ORDER — SODIUM CHLORIDE, SODIUM LACTATE, POTASSIUM CHLORIDE, CALCIUM CHLORIDE 600; 310; 30; 20 MG/100ML; MG/100ML; MG/100ML; MG/100ML
125 INJECTION, SOLUTION INTRAVENOUS CONTINUOUS
Status: DISCONTINUED | OUTPATIENT
Start: 2021-08-10 | End: 2021-08-11

## 2021-08-10 RX ORDER — PROMETHAZINE HYDROCHLORIDE 6.25 MG/5ML
12.5 SYRUP ORAL EVERY 6 HOURS PRN
Status: DISCONTINUED | OUTPATIENT
Start: 2021-08-10 | End: 2021-08-11 | Stop reason: HOSPADM

## 2021-08-10 RX ORDER — LISINOPRIL 10 MG/1
10 TABLET ORAL DAILY
Status: DISCONTINUED | OUTPATIENT
Start: 2021-08-10 | End: 2021-08-11 | Stop reason: HOSPADM

## 2021-08-10 RX ORDER — BUDESONIDE AND FORMOTEROL FUMARATE DIHYDRATE 80; 4.5 UG/1; UG/1
2 AEROSOL RESPIRATORY (INHALATION)
Status: DISCONTINUED | OUTPATIENT
Start: 2021-08-10 | End: 2021-08-11 | Stop reason: HOSPADM

## 2021-08-10 RX ORDER — PROMETHAZINE HYDROCHLORIDE 25 MG/1
25 TABLET ORAL ONCE AS NEEDED
Status: DISCONTINUED | OUTPATIENT
Start: 2021-08-10 | End: 2021-08-10 | Stop reason: HOSPADM

## 2021-08-10 RX ORDER — ONDANSETRON 2 MG/ML
4 INJECTION INTRAMUSCULAR; INTRAVENOUS EVERY 6 HOURS PRN
Status: DISCONTINUED | OUTPATIENT
Start: 2021-08-10 | End: 2021-08-10 | Stop reason: SDUPTHER

## 2021-08-10 RX ORDER — CETIRIZINE HYDROCHLORIDE 10 MG/1
10 TABLET ORAL DAILY
Status: DISCONTINUED | OUTPATIENT
Start: 2021-08-11 | End: 2021-08-11 | Stop reason: HOSPADM

## 2021-08-10 RX ORDER — GLYCOPYRROLATE 0.2 MG/ML
INJECTION INTRAMUSCULAR; INTRAVENOUS AS NEEDED
Status: DISCONTINUED | OUTPATIENT
Start: 2021-08-10 | End: 2021-08-10 | Stop reason: SURG

## 2021-08-10 RX ORDER — BUPIVACAINE HYDROCHLORIDE AND EPINEPHRINE 5; 5 MG/ML; UG/ML
INJECTION, SOLUTION PERINEURAL AS NEEDED
Status: DISCONTINUED | OUTPATIENT
Start: 2021-08-10 | End: 2021-08-10 | Stop reason: HOSPADM

## 2021-08-10 RX ORDER — ONDANSETRON 2 MG/ML
4 INJECTION INTRAMUSCULAR; INTRAVENOUS ONCE AS NEEDED
Status: DISCONTINUED | OUTPATIENT
Start: 2021-08-10 | End: 2021-08-10 | Stop reason: HOSPADM

## 2021-08-10 RX ORDER — GABAPENTIN 300 MG/1
600 CAPSULE ORAL EVERY 8 HOURS SCHEDULED
Status: DISCONTINUED | OUTPATIENT
Start: 2021-08-10 | End: 2021-08-11 | Stop reason: HOSPADM

## 2021-08-10 RX ORDER — ESOMEPRAZOLE MAGNESIUM 40 MG/1
40 CAPSULE, DELAYED RELEASE ORAL
Status: ON HOLD | COMMUNITY
End: 2021-08-22

## 2021-08-10 RX ORDER — METOPROLOL SUCCINATE 25 MG/1
25 TABLET, EXTENDED RELEASE ORAL DAILY
Status: DISCONTINUED | OUTPATIENT
Start: 2021-08-11 | End: 2021-08-11 | Stop reason: HOSPADM

## 2021-08-10 RX ORDER — NALOXONE HCL 0.4 MG/ML
0.1 VIAL (ML) INJECTION
Status: DISCONTINUED | OUTPATIENT
Start: 2021-08-10 | End: 2021-08-11

## 2021-08-10 RX ORDER — SIMETHICONE 80 MG
80 TABLET,CHEWABLE ORAL 4 TIMES DAILY PRN
Status: DISCONTINUED | OUTPATIENT
Start: 2021-08-10 | End: 2021-08-11 | Stop reason: HOSPADM

## 2021-08-10 RX ORDER — HEPARIN SODIUM 5000 [USP'U]/ML
5000 INJECTION, SOLUTION INTRAVENOUS; SUBCUTANEOUS EVERY 8 HOURS SCHEDULED
Status: DISCONTINUED | OUTPATIENT
Start: 2021-08-11 | End: 2021-08-11 | Stop reason: HOSPADM

## 2021-08-10 RX ORDER — NEOSTIGMINE METHYLSULFATE 1 MG/ML
INJECTION, SOLUTION INTRAVENOUS AS NEEDED
Status: DISCONTINUED | OUTPATIENT
Start: 2021-08-10 | End: 2021-08-10 | Stop reason: SURG

## 2021-08-10 RX ORDER — HYDROCHLOROTHIAZIDE 12.5 MG/1
12.5 TABLET ORAL EVERY MORNING
Status: DISCONTINUED | OUTPATIENT
Start: 2021-08-11 | End: 2021-08-11 | Stop reason: HOSPADM

## 2021-08-10 RX ORDER — CEFAZOLIN SODIUM 2 G/100ML
2 INJECTION, SOLUTION INTRAVENOUS ONCE
Status: COMPLETED | OUTPATIENT
Start: 2021-08-10 | End: 2021-08-10

## 2021-08-10 RX ORDER — ENALAPRILAT 2.5 MG/2ML
1.25 INJECTION INTRAVENOUS EVERY 6 HOURS PRN
Status: DISCONTINUED | OUTPATIENT
Start: 2021-08-10 | End: 2021-08-11 | Stop reason: HOSPADM

## 2021-08-10 RX ORDER — HYDRALAZINE HYDROCHLORIDE 20 MG/ML
5 INJECTION INTRAMUSCULAR; INTRAVENOUS
Status: DISCONTINUED | OUTPATIENT
Start: 2021-08-10 | End: 2021-08-10 | Stop reason: HOSPADM

## 2021-08-10 RX ORDER — SODIUM CHLORIDE, SODIUM LACTATE, POTASSIUM CHLORIDE, CALCIUM CHLORIDE 600; 310; 30; 20 MG/100ML; MG/100ML; MG/100ML; MG/100ML
9 INJECTION, SOLUTION INTRAVENOUS CONTINUOUS
Status: DISCONTINUED | OUTPATIENT
Start: 2021-08-10 | End: 2021-08-10

## 2021-08-10 RX ORDER — ONDANSETRON 4 MG/1
4 TABLET, FILM COATED ORAL EVERY 6 HOURS PRN
Status: DISCONTINUED | OUTPATIENT
Start: 2021-08-10 | End: 2021-08-11 | Stop reason: HOSPADM

## 2021-08-10 RX ORDER — FAMOTIDINE 10 MG/ML
20 INJECTION, SOLUTION INTRAVENOUS ONCE
Status: COMPLETED | OUTPATIENT
Start: 2021-08-10 | End: 2021-08-10

## 2021-08-10 RX ORDER — MIDAZOLAM HYDROCHLORIDE 1 MG/ML
1 INJECTION INTRAMUSCULAR; INTRAVENOUS
Status: DISCONTINUED | OUTPATIENT
Start: 2021-08-10 | End: 2021-08-10 | Stop reason: HOSPADM

## 2021-08-10 RX ORDER — ONDANSETRON 2 MG/ML
4 INJECTION INTRAMUSCULAR; INTRAVENOUS EVERY 6 HOURS PRN
Status: DISCONTINUED | OUTPATIENT
Start: 2021-08-10 | End: 2021-08-11 | Stop reason: HOSPADM

## 2021-08-10 RX ORDER — LEVOCETIRIZINE DIHYDROCHLORIDE 5 MG/1
5 TABLET, FILM COATED ORAL EVERY EVENING
COMMUNITY

## 2021-08-10 RX ORDER — FENTANYL CITRATE 50 UG/ML
50 INJECTION, SOLUTION INTRAMUSCULAR; INTRAVENOUS
Status: DISCONTINUED | OUTPATIENT
Start: 2021-08-10 | End: 2021-08-10 | Stop reason: HOSPADM

## 2021-08-10 RX ORDER — DOCUSATE SODIUM 50 MG/5 ML
100 LIQUID (ML) ORAL DAILY
Status: DISCONTINUED | OUTPATIENT
Start: 2021-08-10 | End: 2021-08-11 | Stop reason: HOSPADM

## 2021-08-10 RX ORDER — DIPHENHYDRAMINE HYDROCHLORIDE 50 MG/ML
25 INJECTION INTRAMUSCULAR; INTRAVENOUS EVERY 6 HOURS PRN
Status: DISCONTINUED | OUTPATIENT
Start: 2021-08-10 | End: 2021-08-11 | Stop reason: HOSPADM

## 2021-08-10 RX ORDER — NAPROXEN 250 MG/1
250 TABLET ORAL 2 TIMES DAILY WITH MEALS
Status: DISCONTINUED | OUTPATIENT
Start: 2021-08-10 | End: 2021-08-11 | Stop reason: HOSPADM

## 2021-08-10 RX ORDER — EPHEDRINE SULFATE 50 MG/ML
INJECTION, SOLUTION INTRAVENOUS AS NEEDED
Status: DISCONTINUED | OUTPATIENT
Start: 2021-08-10 | End: 2021-08-10 | Stop reason: SURG

## 2021-08-10 RX ORDER — ROCURONIUM BROMIDE 10 MG/ML
INJECTION, SOLUTION INTRAVENOUS AS NEEDED
Status: DISCONTINUED | OUTPATIENT
Start: 2021-08-10 | End: 2021-08-10 | Stop reason: SURG

## 2021-08-10 RX ORDER — LABETALOL HYDROCHLORIDE 5 MG/ML
5 INJECTION, SOLUTION INTRAVENOUS
Status: DISCONTINUED | OUTPATIENT
Start: 2021-08-10 | End: 2021-08-10 | Stop reason: HOSPADM

## 2021-08-10 RX ORDER — MAGNESIUM HYDROXIDE 1200 MG/15ML
LIQUID ORAL AS NEEDED
Status: DISCONTINUED | OUTPATIENT
Start: 2021-08-10 | End: 2021-08-10 | Stop reason: HOSPADM

## 2021-08-10 RX ORDER — HYDROMORPHONE HYDROCHLORIDE 1 MG/ML
0.5 INJECTION, SOLUTION INTRAMUSCULAR; INTRAVENOUS; SUBCUTANEOUS
Status: DISCONTINUED | OUTPATIENT
Start: 2021-08-10 | End: 2021-08-10 | Stop reason: HOSPADM

## 2021-08-10 RX ORDER — FLUTICASONE PROPIONATE 50 MCG
2 SPRAY, SUSPENSION (ML) NASAL DAILY
Status: DISCONTINUED | OUTPATIENT
Start: 2021-08-10 | End: 2021-08-11 | Stop reason: HOSPADM

## 2021-08-10 RX ORDER — ONDANSETRON 2 MG/ML
INJECTION INTRAMUSCULAR; INTRAVENOUS AS NEEDED
Status: DISCONTINUED | OUTPATIENT
Start: 2021-08-10 | End: 2021-08-10 | Stop reason: SURG

## 2021-08-10 RX ORDER — PROPOFOL 10 MG/ML
VIAL (ML) INTRAVENOUS AS NEEDED
Status: DISCONTINUED | OUTPATIENT
Start: 2021-08-10 | End: 2021-08-10 | Stop reason: SURG

## 2021-08-10 RX ORDER — DEXAMETHASONE SODIUM PHOSPHATE 10 MG/ML
INJECTION INTRAMUSCULAR; INTRAVENOUS AS NEEDED
Status: DISCONTINUED | OUTPATIENT
Start: 2021-08-10 | End: 2021-08-10 | Stop reason: SURG

## 2021-08-10 RX ORDER — AMITRIPTYLINE HYDROCHLORIDE 25 MG/1
25 TABLET, FILM COATED ORAL NIGHTLY
Status: DISCONTINUED | OUTPATIENT
Start: 2021-08-10 | End: 2021-08-11 | Stop reason: HOSPADM

## 2021-08-10 RX ORDER — IPRATROPIUM BROMIDE AND ALBUTEROL SULFATE 2.5; .5 MG/3ML; MG/3ML
3 SOLUTION RESPIRATORY (INHALATION) ONCE AS NEEDED
Status: COMPLETED | OUTPATIENT
Start: 2021-08-10 | End: 2021-08-10

## 2021-08-10 RX ORDER — MONTELUKAST SODIUM 10 MG/1
10 TABLET ORAL NIGHTLY
Status: DISCONTINUED | OUTPATIENT
Start: 2021-08-10 | End: 2021-08-11 | Stop reason: HOSPADM

## 2021-08-10 RX ORDER — LIDOCAINE HYDROCHLORIDE 10 MG/ML
0.5 INJECTION, SOLUTION EPIDURAL; INFILTRATION; INTRACAUDAL; PERINEURAL ONCE AS NEEDED
Status: COMPLETED | OUTPATIENT
Start: 2021-08-10 | End: 2021-08-10

## 2021-08-10 RX ORDER — FENTANYL CITRATE 50 UG/ML
INJECTION, SOLUTION INTRAMUSCULAR; INTRAVENOUS AS NEEDED
Status: DISCONTINUED | OUTPATIENT
Start: 2021-08-10 | End: 2021-08-10 | Stop reason: SURG

## 2021-08-10 RX ORDER — SUCCINYLCHOLINE CHLORIDE 20 MG/ML
INJECTION INTRAMUSCULAR; INTRAVENOUS AS NEEDED
Status: DISCONTINUED | OUTPATIENT
Start: 2021-08-10 | End: 2021-08-10 | Stop reason: SURG

## 2021-08-10 RX ORDER — SODIUM CHLORIDE 0.9 % (FLUSH) 0.9 %
10 SYRINGE (ML) INJECTION AS NEEDED
Status: DISCONTINUED | OUTPATIENT
Start: 2021-08-10 | End: 2021-08-10 | Stop reason: HOSPADM

## 2021-08-10 RX ORDER — TIZANIDINE 4 MG/1
4 TABLET ORAL EVERY 12 HOURS SCHEDULED
Status: DISCONTINUED | OUTPATIENT
Start: 2021-08-10 | End: 2021-08-11 | Stop reason: HOSPADM

## 2021-08-10 RX ORDER — BUDESONIDE AND FORMOTEROL FUMARATE DIHYDRATE 80; 4.5 UG/1; UG/1
2 AEROSOL RESPIRATORY (INHALATION) DAILY
Status: DISCONTINUED | OUTPATIENT
Start: 2021-08-10 | End: 2021-08-10

## 2021-08-10 RX ORDER — PANTOPRAZOLE SODIUM 40 MG/1
40 TABLET, DELAYED RELEASE ORAL EVERY MORNING
Status: DISCONTINUED | OUTPATIENT
Start: 2021-08-10 | End: 2021-08-11 | Stop reason: HOSPADM

## 2021-08-10 RX ORDER — BUPROPION HYDROCHLORIDE 150 MG/1
150 TABLET ORAL DAILY
Status: DISCONTINUED | OUTPATIENT
Start: 2021-08-10 | End: 2021-08-11 | Stop reason: HOSPADM

## 2021-08-10 RX ORDER — MONTELUKAST SODIUM 10 MG/1
10 TABLET ORAL NIGHTLY
COMMUNITY

## 2021-08-10 RX ORDER — SODIUM CHLORIDE 0.9 % (FLUSH) 0.9 %
10 SYRINGE (ML) INJECTION EVERY 12 HOURS SCHEDULED
Status: DISCONTINUED | OUTPATIENT
Start: 2021-08-10 | End: 2021-08-10 | Stop reason: HOSPADM

## 2021-08-10 RX ADMIN — FAMOTIDINE 20 MG: 10 INJECTION INTRAVENOUS at 07:07

## 2021-08-10 RX ADMIN — SODIUM CHLORIDE, POTASSIUM CHLORIDE, SODIUM LACTATE AND CALCIUM CHLORIDE 125 ML/HR: 600; 310; 30; 20 INJECTION, SOLUTION INTRAVENOUS at 20:41

## 2021-08-10 RX ADMIN — FENTANYL CITRATE 50 MCG: 50 INJECTION, SOLUTION INTRAMUSCULAR; INTRAVENOUS at 10:57

## 2021-08-10 RX ADMIN — BUDESONIDE AND FORMOTEROL FUMARATE DIHYDRATE 2 PUFF: 80; 4.5 AEROSOL RESPIRATORY (INHALATION) at 19:44

## 2021-08-10 RX ADMIN — LISINOPRIL 10 MG: 10 TABLET ORAL at 15:06

## 2021-08-10 RX ADMIN — GABAPENTIN 600 MG: 300 CAPSULE ORAL at 20:34

## 2021-08-10 RX ADMIN — ROCURONIUM BROMIDE 20 MG: 10 INJECTION, SOLUTION INTRAVENOUS at 08:35

## 2021-08-10 RX ADMIN — SIMETHICONE 80 MG: 80 TABLET, CHEWABLE ORAL at 20:46

## 2021-08-10 RX ADMIN — CEFAZOLIN SODIUM 2 G: 2 INJECTION, SOLUTION INTRAVENOUS at 07:54

## 2021-08-10 RX ADMIN — MONTELUKAST SODIUM 10 MG: 10 TABLET, COATED ORAL at 20:34

## 2021-08-10 RX ADMIN — FENTANYL CITRATE 100 MCG: 50 INJECTION, SOLUTION INTRAMUSCULAR; INTRAVENOUS at 07:57

## 2021-08-10 RX ADMIN — ROPINIROLE HYDROCHLORIDE 5 MG: 2 TABLET, FILM COATED ORAL at 20:38

## 2021-08-10 RX ADMIN — GLYCOPYRROLATE 0.6 MG: 0.2 INJECTION INTRAMUSCULAR; INTRAVENOUS at 10:23

## 2021-08-10 RX ADMIN — ROCURONIUM BROMIDE 50 MG: 10 INJECTION, SOLUTION INTRAVENOUS at 07:57

## 2021-08-10 RX ADMIN — DOCUSATE SODIUM 100 MG: 50 LIQUID ORAL at 15:06

## 2021-08-10 RX ADMIN — LIDOCAINE HYDROCHLORIDE 0.5 ML: 10 INJECTION, SOLUTION EPIDURAL; INFILTRATION; INTRACAUDAL; PERINEURAL at 07:07

## 2021-08-10 RX ADMIN — SUCCINYLCHOLINE CHLORIDE 200 MG: 20 INJECTION, SOLUTION INTRAMUSCULAR; INTRAVENOUS at 07:57

## 2021-08-10 RX ADMIN — METRONIDAZOLE 500 MG: 500 TABLET ORAL at 20:36

## 2021-08-10 RX ADMIN — GLYCOPYRROLATE 0.2 MG: 0.2 INJECTION INTRAMUSCULAR; INTRAVENOUS at 08:11

## 2021-08-10 RX ADMIN — AMITRIPTYLINE HYDROCHLORIDE 25 MG: 25 TABLET, FILM COATED ORAL at 20:34

## 2021-08-10 RX ADMIN — METRONIDAZOLE 500 MG: 500 TABLET ORAL at 15:06

## 2021-08-10 RX ADMIN — DEXAMETHASONE SODIUM PHOSPHATE 8 MG: 10 INJECTION INTRAMUSCULAR; INTRAVENOUS at 08:02

## 2021-08-10 RX ADMIN — NAPROXEN 250 MG: 250 TABLET ORAL at 17:51

## 2021-08-10 RX ADMIN — Medication: at 10:52

## 2021-08-10 RX ADMIN — PROPOFOL 200 MG: 10 INJECTION, EMULSION INTRAVENOUS at 07:57

## 2021-08-10 RX ADMIN — EPHEDRINE SULFATE 5 MG: 50 INJECTION INTRAVENOUS at 08:08

## 2021-08-10 RX ADMIN — SODIUM CHLORIDE, POTASSIUM CHLORIDE, SODIUM LACTATE AND CALCIUM CHLORIDE: 600; 310; 30; 20 INJECTION, SOLUTION INTRAVENOUS at 08:57

## 2021-08-10 RX ADMIN — ROCURONIUM BROMIDE 20 MG: 10 INJECTION, SOLUTION INTRAVENOUS at 09:28

## 2021-08-10 RX ADMIN — SODIUM CHLORIDE, POTASSIUM CHLORIDE, SODIUM LACTATE AND CALCIUM CHLORIDE 125 ML/HR: 600; 310; 30; 20 INJECTION, SOLUTION INTRAVENOUS at 13:47

## 2021-08-10 RX ADMIN — TIZANIDINE 4 MG: 4 TABLET ORAL at 20:34

## 2021-08-10 RX ADMIN — LIDOCAINE HYDROCHLORIDE 50 MG: 10 INJECTION, SOLUTION EPIDURAL; INFILTRATION; INTRACAUDAL; PERINEURAL at 07:57

## 2021-08-10 RX ADMIN — GABAPENTIN 600 MG: 300 CAPSULE ORAL at 15:06

## 2021-08-10 RX ADMIN — HYDROCODONE BITARTRATE AND ACETAMINOPHEN 15 ML: 7.5; 325 SOLUTION ORAL at 20:33

## 2021-08-10 RX ADMIN — SIMETHICONE 80 MG: 80 TABLET, CHEWABLE ORAL at 15:06

## 2021-08-10 RX ADMIN — ROCURONIUM BROMIDE 10 MG: 10 INJECTION, SOLUTION INTRAVENOUS at 08:57

## 2021-08-10 RX ADMIN — IPRATROPIUM BROMIDE AND ALBUTEROL SULFATE 3 ML: 2.5; .5 SOLUTION RESPIRATORY (INHALATION) at 11:01

## 2021-08-10 RX ADMIN — ONDANSETRON 4 MG: 2 INJECTION INTRAMUSCULAR; INTRAVENOUS at 10:23

## 2021-08-10 RX ADMIN — NEOSTIGMINE METHYLSULFATE 3 MG: 0.5 INJECTION INTRAVENOUS at 10:23

## 2021-08-10 RX ADMIN — BUPROPION HYDROCHLORIDE 150 MG: 150 TABLET, FILM COATED, EXTENDED RELEASE ORAL at 15:06

## 2021-08-10 RX ADMIN — SIMETHICONE 80 MG: 80 TABLET, CHEWABLE ORAL at 11:31

## 2021-08-10 RX ADMIN — SODIUM CHLORIDE, POTASSIUM CHLORIDE, SODIUM LACTATE AND CALCIUM CHLORIDE 9 ML/HR: 600; 310; 30; 20 INJECTION, SOLUTION INTRAVENOUS at 07:07

## 2021-08-10 RX ADMIN — ROCURONIUM BROMIDE 20 MG: 10 INJECTION, SOLUTION INTRAVENOUS at 08:22

## 2021-08-10 NOTE — ANESTHESIA POSTPROCEDURE EVALUATION
Patient: Back Alicea    Procedure Summary     Date: 08/10/21 Room / Location:  [facility]    Anesthesia Start: 0754 Anesthesia Stop:     Procedure: NISSEN LAPAROSCOPIC WITH EGD (N/A Abdomen) Diagnosis:     Surgeons: Ralf Mcgee MD Provider: Ari Esteban MD    Anesthesia Type: general ASA Status: 3          Anesthesia Type: general    Vitals  Vitals Value Taken Time   BP     Temp     Pulse 76 08/10/21 1037   Resp     SpO2 93 % 08/10/21 1037   Vitals shown include unvalidated device data.        Post Anesthesia Care and Evaluation    Patient location during evaluation: PACU  Patient participation: complete - patient cannot participate  Pain management: adequate  Airway patency: patent  Anesthetic complications: No anesthetic complications  PONV Status: none  Cardiovascular status: acceptable  Respiratory status: acceptable  Hydration status: acceptable

## 2021-08-10 NOTE — H&P
Pre-Op H&P    Wong Woburn  [id number]  1955    Chief complaint heartburn     Subjective:  Patient is a 66 y.o.male who presents with heartburn symptoms for several years. Patient reports an intermittent burning pain in the epigastric area that is worse after eating large meals and with lying flat. He has been on a H2 blocker and PPI and notes symptom relief with those. Previous studies include endoscopy, esophageal manometry, and 24hr pH. Patient denies any nausea, vomiting, or weight loss. He denies any recent changes to his overall health.     Review of Systems:  General ROS: negative  Cardiovascular ROS: no chest pain or dyspnea on exertion  Respiratory ROS: positive for - shortness of breath, hx of COPD and worker's lung      Allergies:   Allergies   Allergen Reactions   • Other    • Codeine Anxiety   • Erythromycin Rash   • Niacin Rash   • Sulfa Antibiotics Rash   • Wheat Bran Rash          Latex: negative  Contrast Dye: negative     Home Meds    Medications Prior to Admission   Medication Sig Dispense Refill Last Dose   • amitriptyline (ELAVIL) 25 MG tablet Take 25 mg by mouth every night at bedtime.      • Budesonide-Formoterol Fumarate (SYMBICORT IN) Inhale Daily.      • buPROPion XL (WELLBUTRIN XL) 150 MG 24 hr tablet Daily.      • diclofenac (VOLTAREN) 75 MG EC tablet Take 75 mg by mouth 2 (Two) Times a Day.      • ezetimibe (ZETIA) 10 MG tablet Take 10 mg by mouth Daily.      • famotidine (PEPCID) 40 MG tablet Take 40 mg by mouth At Night As Needed for Indigestion or Heartburn.      • gabapentin (NEURONTIN) 600 MG tablet Take 600 mg by mouth 3 (Three) Times a Day.      • hydroCHLOROthiazide (HYDRODIURIL) 12.5 MG tablet Take 12.5 mg by mouth Every Morning.      • lidocaine (LIDODERM) 5 % As Needed.      • lisinopril (PRINIVIL,ZESTRIL) 10 MG tablet Take 10 mg by mouth daily.      • Magnesium 400 MG tablet Take 400 mg by mouth Daily.      • metoprolol succinate XL (TOPROL-XL) 25 MG 24 hr tablet Take 25  mg by mouth Daily.      • metroNIDAZOLE (FLAGYL PO) Take 500 mg by mouth 2 (two) times a day.      • rOPINIRole (REQUIP) 5 MG tablet Take 5 mg by mouth 2 (Two) Times a Day.      • rosuvastatin (CRESTOR) 20 MG tablet Take 20 mg by mouth Daily.      • sildenafil (VIAGRA) 100 MG tablet Take 100 mg by mouth Daily As Needed.      • SYMBICORT 80-4.5 MCG/ACT inhaler Daily.      • tiZANidine (ZANAFLEX) 4 MG tablet Take 4 mg by mouth 2 (two) times a day.      • vitamin D (ERGOCALCIFEROL) 1.25 MG (41422 UT) capsule capsule 1 (One) Time Per Week. Sunday        PMH:   Past Medical History:   Diagnosis Date   • Aneurysm (CMS/HCC)    • Arthritis    • Black lung disease (CMS/HCC)    • Cataract    • COPD (chronic obstructive pulmonary disease) (CMS/HCC)    • Depression    • Full dentures    • GERD (gastroesophageal reflux disease)    • Hiatal hernia    • Hypertension    • Obstructive sleep apnea treated with BiPAP    • Renal cyst    • Sleep apnea    • Thoracic aortic aneurysm (CMS/HCC)      PSH:    Past Surgical History:   Procedure Laterality Date   • CARPAL TUNNEL RELEASE     • CATARACT EXTRACTION Bilateral     cataract surgery    • CHOLECYSTECTOMY     • COLONOSCOPY  2019   • ESOPHAGEAL MOTILITY STUDY N/A 6/18/2021    Procedure: MANOMETRY/PH;  Surgeon: Ralf Mcgee MD;  Location:  [facility];  Service: General;  Laterality: N/A;  procedure completed successfully.   • GASTRIC PH MONITORING 24HR N/A 6/18/2021    Procedure: PH;  Surgeon: Ralf Mcgee MD;  Location:  [facility];  Service: General;  Laterality: N/A;  Probe passed successfully; secured @ 34 cm. LES @ 43.8 cm.   • HAND SURGERY     • JOINT REPLACEMENT     • REPLACEMENT TOTAL KNEE     • SHOULDER SURGERY     • SINUS SURGERY     • VEIN LACERATION REPAIR      OPEN LEFT KNEE SURGERY FOR VEIN REPAIR     Immunization History: pneumo up to date   Flu 2020  Tetanus up to date Covid-19 x2/2  Social History:   Tobacco 10-15 year history as a teenager, cigar  use for the last 1  year   Alcohol negative       Physical Exam:/83 (BP Location: Right arm, Patient Position: Lying)   Pulse 56   Temp 97.4 °F (36.3 °C) (Temporal)   Resp 18   SpO2 95%       General Appearance:    Alert, cooperative, no distress, appears stated age   Head:    Normocephalic, without obvious abnormality, atraumatic   Lungs:     Clear to auscultation bilaterally, respirations unlabored    Heart:   Regular rate and rhythm, S1 and S2 normal, no murmur, rub    or gallop    Abdomen:    Soft without tenderness   Breast Exam:    deferred   Genitalia:    deferred   Extremities:   Extremities normal, atraumatic, no cyanosis or edema   Skin:   Skin color, texture, turgor normal, no rashes or lesions   Neurologic:   Grossly intact     Results Review:   LABS:  Lab Results   Component Value Date    WBC 7.79 08/09/2021    HGB 15.6 08/09/2021    HCT 45.7 08/09/2021    MCV 93.3 08/09/2021     08/09/2021    GLUCOSE 106 (H) 08/09/2021    BUN 17 08/09/2021    CREATININE 0.96 08/09/2021    EGFRIFNONA 79 08/09/2021    EGFRIFAFRI 96 08/09/2021     08/09/2021    K 4.4 08/09/2021     08/09/2021    CO2 26.0 08/09/2021    CALCIUM 9.4 08/09/2021       RADIOLOGY:  Imaging Results (Last 72 Hours)     ** No results found for the last 72 hours. **            Cancer Patient: __ yes _X_no __unknown; If yes, clinical stage T:__ N:__M:__, stage group        Impression: hiatal hernia, GERD    Plan: Nissen laparoscopic with mesh   Myra Richey PA-C 8/10/2021 06:56 EDT

## 2021-08-10 NOTE — ANESTHESIA PREPROCEDURE EVALUATION
Anesthesia Evaluation                  Airway   Mallampati: I  TM distance: >3 FB  Neck ROM: full  No difficulty expected  Dental      Pulmonary    (+) COPD, sleep apnea,   Cardiovascular     ECG reviewed    (+) hypertension,       Neuro/Psych  (+) psychiatric history Anxiety and Depression,     GI/Hepatic/Renal/Endo    (+) obesity,  hiatal hernia, GERD,  renal disease,     Musculoskeletal     Abdominal    Substance History      OB/GYN          Other                        Anesthesia Plan    ASA 3     general     intravenous induction     Anesthetic plan, all risks, benefits, and alternatives have been provided, discussed and informed consent has been obtained with: patient.    Plan discussed with CRNA.

## 2021-08-10 NOTE — OP NOTE
OPERATIVE NOTE    Patient Name:  Wong Alicea  YOB: 1955  [id number]    8/10/2021        PREOPERATIVE DIAGNOSIS: GERD with hiatal hernia        POSTOPERATIVE DIAGNOSIS: Same         PROCEDURE PERFORMED:    Laparoscopic hiatal hernia repair with mesh, Nissen fundoplication with mesh, EGD         SURGEON: Ralf Mcgee MD       ASSISTANT:   Haydee Kelly MD      SPECIMENS: Hernia sac          ANESTHESIA: General.          FINDINGS:   1.  Small hiatal hernia completely reduced and repaired with a posterior hiatal cruroplasty, and reinforced with a piece of Phasix ST  mesh    2.  Nissen fundoplication performed around 50 Czech bougie    3.  Dense adhesions of the stomach to the retroperitoneum and colonic mesentery which resulted in some venous bleeding.  This was controlled with clip application and LigaSure.  The colon was completely viable.         INDICATIONS:    Wong Alicea is a very pleasant 66 y.o. male with a history of reflux disease and a hiatal hernia. After a complete preoperative workup they were deemed an operative candidate. The risks and benefits were discussed at length with the patient and their family and they agreed to proceed.         DESCRIPTION OF PROCEDURE:      After obtaining informed consent, the patient was taken to the operating room and placed in supine position. After appropriate DVT and antibiotic prophylaxis, general anesthesia was induced. The abdomen was prepped and draped in standard sterile fashion.  After infiltrating the skin with local anesthetic a 12 mm skin incision was made approximately 10 cm from xiphoid process along the left costal margin. An optically guided trocar was then advanced through the abdominal wall into the abdominal cavity without difficulty. The abdomen was insufflated with carbon dioxide gas to a pressure of 15 mmHg. The laparoscope was then advanced through the trocar and the abdominal contents inspected. There was no evidence of  bowel bladder or visceral injury with entry of the trocar. At this point after infiltrating the appropriate areas with local anesthetic a standard laparoscopic Nissen fundoplication port placement schema was followed. A liver retractor was used to retract the liver anteriorly.     Using a combination of electrocautery and ultrasonic dissection the greater curvature of the stomach was mobilized up to the left lilly.  There was a large amount of dense adhesions of the mid body of the stomach to the retroperitoneum and colonic mesentery on the left.  This resulted in some bleeding from what appeared to be superficial mesenteric vessels.  We were able to control this area with clip application and LigaSure.  No damage to the colon was made whatsoever.  This was further made difficult by the patient's  spleen which is somewhat enlarged.  Spleen was uninjured.  The pars flaccida was then divided superior and inferior to the hepatic branch of the vagus nerve which was spared throughout the procedure. The anterior surface of the right lilly was then scored and using meticulous blunt dissection a retroesophageal window to the left side was created. A Penrose was placed through this and used to encircle the GE junction. A circumferential mobilization of the GE junction from the hiatus was performed using a combination of electrocautery ultrasonic and blunt dissection. This dissection was carried up into the mediastinum to the level of the aortic arch. The anterior and posterior vagus nerves were identified and spared throughout the procedure. The right and left pleural spaces were identified.  The right side was spared, but a small unavoidable entry into the left was made.  This was clinically unremarkable. After complete mobilization the hernia sac was taken off the anterior surface of the stomach using ultrasonic dissection and passed off as specimen  .     A posterior hiatal cruroplasty was then performed using pledgeted 0  "silk sutures. This created a snug closure of the hiatus around the esophagus. A piece of Phasix ST  mesh was then placed posteriorly, and tacked to the diaphragm using silk sutures. The anesthetist advanced a 50 Kyr[place]z orogastric bougie under direct visualization into the stomach. Around this bougie at the level of the GE junction a Nissen fundoplication was then performed using silk sutures. At the completion of the fundoplication, the bougie was removed, as was the Penrose drain, which was discarded.     An endoscope was then advanced through the mouth and the esophagus into the stomach under direct visualization. The GE junction was visualized within the fundoplication. Under maximal insufflation a retroflexed view of the GE junction was appreciated. This demonstrated an excellent \"stack of coins\" appearance to the fundoplication with good apposition of the GE junction around the scope.The endoscope was then used to desufflate the stomach and removed from the patient's mouth without difficulty.    Given the bleeding at the beginning of the case, the area on the colonic mesentery was again inspected.  There was no bleeding whatsoever.  The abdomen was desufflated and reinsufflated several times to make sure the pneumoperitoneum was not tamponading any bleeding, and there was none.     The liver retractor was then removed. All trocars were then removed under direct and laparoscopic visualization and the abdomen desufflated. The incisions were then closed using running subcuticular sutures and dressed with dry sterile dressings.      All sponge and needle counts were correct times two at the completion of the procedure. The patient recovered from anesthesia, was extubated in the operating room and was transported to the PACU in stable condition.            Ralf Mcgee MD  8/10/2021  10:26 EDT      "

## 2021-08-10 NOTE — BRIEF OP NOTE
NISSEN FUNDOPLICATION LAPAROSCOPIC  Progress Note    [facility]  8/10/[facility]    Pre-op Diagnosis:   GERD with hiatal hernia       Post-Op Diagnosis Codes:  Same    Procedure/CPT® Codes:        Procedure(s):  NISSEN LAPAROSCOPIC WITH EGD    Surgeon(s):  Ralf Mcgee MD Madabhushi, Vashisht V, MD    Anesthesia: General    Staff:   Circulator: Danna Hendrickson RN; Iva Pereira RN  Scrub Person: Greta Malloy; Valentina Worthington         Estimated Blood Loss: 100 mL    Urine Voided: * No values recorded between 8/10/2021  7:54 AM and 8/10/2021 10:22 AM *    Specimens:                Specimens     ID Source Type Tests Collected By Collected At Frozen?    A Hernia, Sac Tissue · TISSUE PATHOLOGY EXAM   Ralf Mcgee MD 8/10/21 0944 No    This specimen was not marked as sent.                Drains: * No LDAs found *    Findings:  1.  Small hiatal hernia completely reduced and repaired with a posterior hiatal cruroplasty, and reinforced with Phasix ST mesh  2.  Nissen fundoplication performed around 50 Bulgarian bougie  3.  Adhesions of the stomach to the colonic mesentery consistent with prior episode of pancreatitis resulting in some bleeding.  This was controlled with LigaSure and clip application.  The colon was viable.    Complications: None          Ralf Mcgee MD     Date: 8/10/2021  Time: 10:25 EDT

## 2021-08-10 NOTE — ANESTHESIA PROCEDURE NOTES
Airway  Urgency: elective    Date/Time: 8/10/2021 7:57 AM  Airway not difficult    General Information and Staff    Patient location during procedure: OR  CRNA: Sabra Alvarado CRNA    Indications and Patient Condition  Indications for airway management: airway protection    Preoxygenated: yes  MILS not maintained throughout  Mask difficulty assessment: 0 - not attempted (RSI)    Final Airway Details  Final airway type: endotracheal airway      Successful airway: ETT  Cuffed: yes   Successful intubation technique: direct laryngoscopy  Endotracheal tube insertion site: oral  Blade: Griffin  Blade size: 2  ETT size (mm): 7.5  Cormack-Lehane Classification: grade I - full view of glottis  Placement verified by: chest auscultation and capnometry   Measured from: lips  ETT/EBT  to lips (cm): 20  Number of attempts at approach: 1  Assessment: lips, teeth, and gum same as pre-op and atraumatic intubation    Additional Comments  Negative epigastric sounds, Breath sound equal bilaterally with symmetric chest rise and fall

## 2021-08-11 ENCOUNTER — APPOINTMENT (OUTPATIENT)
Dept: GENERAL RADIOLOGY | Facility: HOSPITAL | Age: 66
End: 2021-08-11

## 2021-08-11 VITALS
HEART RATE: 62 BPM | OXYGEN SATURATION: 95 % | WEIGHT: 230 LBS | HEIGHT: 74 IN | BODY MASS INDEX: 29.52 KG/M2 | DIASTOLIC BLOOD PRESSURE: 57 MMHG | SYSTOLIC BLOOD PRESSURE: 94 MMHG | TEMPERATURE: 96.2 F | RESPIRATION RATE: 18 BRPM

## 2021-08-11 LAB
ANION GAP SERPL CALCULATED.3IONS-SCNC: 10 MMOL/L (ref 5–15)
BASOPHILS # BLD AUTO: 0.02 10*3/MM3 (ref 0–0.2)
BASOPHILS NFR BLD AUTO: 0.2 % (ref 0–1.5)
BUN SERPL-MCNC: 13 MG/DL (ref 8–23)
BUN/CREAT SERPL: 16.9 (ref 7–25)
CALCIUM SPEC-SCNC: 9.2 MG/DL (ref 8.6–10.5)
CHLORIDE SERPL-SCNC: 105 MMOL/L (ref 98–107)
CO2 SERPL-SCNC: 26 MMOL/L (ref 22–29)
CREAT SERPL-MCNC: 0.77 MG/DL (ref 0.76–1.27)
CYTO UR: NORMAL
DEPRECATED RDW RBC AUTO: 45 FL (ref 37–54)
EOSINOPHIL # BLD AUTO: 0 10*3/MM3 (ref 0–0.4)
EOSINOPHIL NFR BLD AUTO: 0 % (ref 0.3–6.2)
ERYTHROCYTE [DISTWIDTH] IN BLOOD BY AUTOMATED COUNT: 13.2 % (ref 12.3–15.4)
GFR SERPL CREATININE-BSD FRML MDRD: 102 ML/MIN/1.73
GFR SERPL CREATININE-BSD FRML MDRD: 123 ML/MIN/1.73
GLUCOSE SERPL-MCNC: 151 MG/DL (ref 65–99)
HCT VFR BLD AUTO: 46.4 % (ref 37.5–51)
HGB BLD-MCNC: 15.7 G/DL (ref 13–17.7)
IMM GRANULOCYTES # BLD AUTO: 0.04 10*3/MM3 (ref 0–0.05)
IMM GRANULOCYTES NFR BLD AUTO: 0.3 % (ref 0–0.5)
LAB AP CASE REPORT: NORMAL
LAB AP CLINICAL INFORMATION: NORMAL
LYMPHOCYTES # BLD AUTO: 1.31 10*3/MM3 (ref 0.7–3.1)
LYMPHOCYTES NFR BLD AUTO: 10.1 % (ref 19.6–45.3)
MCH RBC QN AUTO: 31.7 PG (ref 26.6–33)
MCHC RBC AUTO-ENTMCNC: 33.8 G/DL (ref 31.5–35.7)
MCV RBC AUTO: 93.5 FL (ref 79–97)
MONOCYTES # BLD AUTO: 0.85 10*3/MM3 (ref 0.1–0.9)
MONOCYTES NFR BLD AUTO: 6.5 % (ref 5–12)
NEUTROPHILS NFR BLD AUTO: 10.76 10*3/MM3 (ref 1.7–7)
NEUTROPHILS NFR BLD AUTO: 82.9 % (ref 42.7–76)
NRBC BLD AUTO-RTO: 0 /100 WBC (ref 0–0.2)
PATH REPORT.FINAL DX SPEC: NORMAL
PATH REPORT.GROSS SPEC: NORMAL
PLATELET # BLD AUTO: 287 10*3/MM3 (ref 140–450)
PMV BLD AUTO: 10.7 FL (ref 6–12)
POTASSIUM SERPL-SCNC: 4.1 MMOL/L (ref 3.5–5.2)
RBC # BLD AUTO: 4.96 10*6/MM3 (ref 4.14–5.8)
SODIUM SERPL-SCNC: 141 MMOL/L (ref 136–145)
WBC # BLD AUTO: 12.98 10*3/MM3 (ref 3.4–10.8)

## 2021-08-11 PROCEDURE — 74240 X-RAY XM UPR GI TRC 1CNTRST: CPT

## 2021-08-11 PROCEDURE — 63710000001 HYDROCHLOROTHIAZIDE 12.5 MG CAPSULE: Performed by: SURGERY

## 2021-08-11 PROCEDURE — A9270 NON-COVERED ITEM OR SERVICE: HCPCS | Performed by: SURGERY

## 2021-08-11 PROCEDURE — 85025 COMPLETE CBC W/AUTO DIFF WBC: CPT | Performed by: SURGERY

## 2021-08-11 PROCEDURE — 63710000001 METRONIDAZOLE 500 MG TABLET: Performed by: SURGERY

## 2021-08-11 PROCEDURE — 63710000001 FLUTICASONE 50 MCG/ACT SUSPENSION 16 G BOTTLE: Performed by: SURGERY

## 2021-08-11 PROCEDURE — 80048 BASIC METABOLIC PNL TOTAL CA: CPT | Performed by: SURGERY

## 2021-08-11 PROCEDURE — 71045 X-RAY EXAM CHEST 1 VIEW: CPT

## 2021-08-11 PROCEDURE — 63710000001 METOPROLOL SUCCINATE XL 25 MG TABLET SUSTAINED-RELEASE 24 HOUR: Performed by: SURGERY

## 2021-08-11 PROCEDURE — 94799 UNLISTED PULMONARY SVC/PX: CPT

## 2021-08-11 PROCEDURE — 63710000001 GABAPENTIN 300 MG CAPSULE: Performed by: SURGERY

## 2021-08-11 PROCEDURE — 63710000001 DOCUSATE SODIUM 150 MG/15ML LIQUID: Performed by: SURGERY

## 2021-08-11 PROCEDURE — 63710000001 PANTOPRAZOLE 40 MG TABLET DELAYED-RELEASE: Performed by: SURGERY

## 2021-08-11 PROCEDURE — 0 DIATRIZOATE MEGLUMINE & SODIUM PER 1 ML: Performed by: SURGERY

## 2021-08-11 PROCEDURE — 63710000001 ROPINIROLE 1 MG TABLET: Performed by: SURGERY

## 2021-08-11 PROCEDURE — 63710000001 CETIRIZINE 10 MG TABLET: Performed by: SURGERY

## 2021-08-11 PROCEDURE — 63710000001 BUPROPION XL 150 MG TABLET SUSTAINED-RELEASE 24 HOUR: Performed by: SURGERY

## 2021-08-11 PROCEDURE — 25010000002 HEPARIN (PORCINE) PER 1000 UNITS: Performed by: SURGERY

## 2021-08-11 PROCEDURE — 63710000001 TIZANIDINE 4 MG TABLET: Performed by: SURGERY

## 2021-08-11 PROCEDURE — 63710000001 LISINOPRIL 10 MG TABLET: Performed by: SURGERY

## 2021-08-11 PROCEDURE — G0378 HOSPITAL OBSERVATION PER HR: HCPCS

## 2021-08-11 PROCEDURE — 63710000001 NAPROXEN 250 MG TABLET: Performed by: SURGERY

## 2021-08-11 RX ORDER — DOCUSATE SODIUM 50 MG/5 ML
100 LIQUID (ML) ORAL DAILY
Qty: 200 ML | Refills: 0 | Status: SHIPPED | OUTPATIENT
Start: 2021-08-12

## 2021-08-11 RX ORDER — HYDROMORPHONE HYDROCHLORIDE 1 MG/ML
0.2 INJECTION, SOLUTION INTRAMUSCULAR; INTRAVENOUS; SUBCUTANEOUS
Status: DISCONTINUED | OUTPATIENT
Start: 2021-08-11 | End: 2021-08-11 | Stop reason: HOSPADM

## 2021-08-11 RX ORDER — PROMETHAZINE HYDROCHLORIDE 6.25 MG/5ML
12.5 SYRUP ORAL EVERY 6 HOURS PRN
Qty: 300 ML | Refills: 0 | Status: SHIPPED | OUTPATIENT
Start: 2021-08-11

## 2021-08-11 RX ADMIN — ROPINIROLE HYDROCHLORIDE 5 MG: 2 TABLET, FILM COATED ORAL at 09:01

## 2021-08-11 RX ADMIN — TIZANIDINE 4 MG: 4 TABLET ORAL at 09:02

## 2021-08-11 RX ADMIN — NAPROXEN 250 MG: 250 TABLET ORAL at 09:01

## 2021-08-11 RX ADMIN — METOPROLOL SUCCINATE 25 MG: 25 TABLET, EXTENDED RELEASE ORAL at 09:01

## 2021-08-11 RX ADMIN — LISINOPRIL 10 MG: 10 TABLET ORAL at 09:02

## 2021-08-11 RX ADMIN — NAPROXEN 250 MG: 250 TABLET ORAL at 16:57

## 2021-08-11 RX ADMIN — METRONIDAZOLE 500 MG: 500 TABLET ORAL at 14:44

## 2021-08-11 RX ADMIN — HEPARIN SODIUM 5000 UNITS: 5000 INJECTION INTRAVENOUS; SUBCUTANEOUS at 05:04

## 2021-08-11 RX ADMIN — HEPARIN SODIUM 5000 UNITS: 5000 INJECTION INTRAVENOUS; SUBCUTANEOUS at 14:47

## 2021-08-11 RX ADMIN — BUPROPION HYDROCHLORIDE 150 MG: 150 TABLET, FILM COATED, EXTENDED RELEASE ORAL at 09:02

## 2021-08-11 RX ADMIN — HYDROCHLOROTHIAZIDE 12.5 MG: 12.5 CAPSULE ORAL at 09:02

## 2021-08-11 RX ADMIN — GABAPENTIN 600 MG: 300 CAPSULE ORAL at 05:04

## 2021-08-11 RX ADMIN — DOCUSATE SODIUM 100 MG: 50 LIQUID ORAL at 09:02

## 2021-08-11 RX ADMIN — FLUTICASONE PROPIONATE 2 SPRAY: 50 SPRAY, METERED NASAL at 09:02

## 2021-08-11 RX ADMIN — DIATRIZOATE MEGLUMINE AND DIATRIZOATE SODIUM 120 ML: 660; 100 LIQUID ORAL; RECTAL at 09:51

## 2021-08-11 RX ADMIN — PANTOPRAZOLE SODIUM 40 MG: 40 TABLET, DELAYED RELEASE ORAL at 09:02

## 2021-08-11 RX ADMIN — SODIUM CHLORIDE, POTASSIUM CHLORIDE, SODIUM LACTATE AND CALCIUM CHLORIDE 125 ML/HR: 600; 310; 30; 20 INJECTION, SOLUTION INTRAVENOUS at 03:59

## 2021-08-11 RX ADMIN — CETIRIZINE HYDROCHLORIDE 10 MG: 10 TABLET, FILM COATED ORAL at 09:01

## 2021-08-11 RX ADMIN — BUDESONIDE AND FORMOTEROL FUMARATE DIHYDRATE 2 PUFF: 80; 4.5 AEROSOL RESPIRATORY (INHALATION) at 07:46

## 2021-08-11 RX ADMIN — METRONIDAZOLE 500 MG: 500 TABLET ORAL at 05:04

## 2021-08-11 RX ADMIN — GABAPENTIN 600 MG: 300 CAPSULE ORAL at 14:44

## 2021-08-11 NOTE — PROGRESS NOTES
"Patient Name:  Wong Alicea  YOB: 1955  [id number]    Surgery Post - Operative Note    Date of visit: 8/10/2021    Subjective   Subjective: Stable after OR, only complaint is gas pain       Objective     Objective:    /83 (BP Location: Right arm, Patient Position: Lying)   Pulse 87   Temp 96.8 °F (36 °C) (Oral)   Resp 16   Ht 188 cm (74\")   Wt 104 kg (230 lb)   SpO2 94%   BMI 29.53 kg/m²     CV:  Rate  regular and rhythm  regular  L:  Clear  to auscultation bilaterally   ABD:  Soft, appropriately tender. Dressings  clean, dry and intact   EXT:  No cyanosis, clubbing or edema    CXR shows no PTX       Assessment/Plan     Assessment/ Plan: Doing well after Lap Nissen. Continue Pulmonary toilet    Problem List Items Addressed This Visit        Gastrointestinal Abdominal     Paraesophageal hernia    Relevant Orders    Tissue Pathology Exam           Active Hospital Problems    Diagnosis  POA   • **Hiatal hernia [K44.9]  Yes   • Paraesophageal hernia [K44.9]  Yes   • Hypertension [I10]  Yes   • Hyperlipidemia [E78.5]  Yes      Resolved Hospital Problems   No resolved problems to display.            Ralf Mcgee MD  8/10/2021  21:18 EDT    "

## 2021-08-11 NOTE — PLAN OF CARE
Goal Outcome Evaluation:  Plan of Care Reviewed With: patient           Outcome Summary: Pt still having gas pain, MD ordered CXR.  Monitoring bandages for bleeding, upper left one has a moderate amount of blood.  No other issues noted.  VSS.  Will continue to monitor.

## 2021-08-11 NOTE — PLAN OF CARE
Problem: Adult Inpatient Plan of Care  Goal: Absence of Hospital-Acquired Illness or Injury  Intervention: Identify and Manage Fall Risk  Recent Flowsheet Documentation  Taken 8/11/2021 1400 by Herman Glaser, RN  Safety Promotion/Fall Prevention:   activity supervised   clutter free environment maintained   assistive device/personal items within reach   fall prevention program maintained   nonskid shoes/slippers when out of bed   safety round/check completed   room organization consistent  Taken 8/11/2021 1200 by Herman Glaser, RN  Safety Promotion/Fall Prevention:   activity supervised   assistive device/personal items within reach   clutter free environment maintained   fall prevention program maintained   nonskid shoes/slippers when out of bed   room organization consistent   safety round/check completed  Taken 8/11/2021 1000 by Herman Glaser, RN  Safety Promotion/Fall Prevention:   activity supervised   assistive device/personal items within reach   clutter free environment maintained   fall prevention program maintained   nonskid shoes/slippers when out of bed   safety round/check completed   room organization consistent  Taken 8/11/2021 0830 by Herman Glaser, RN  Safety Promotion/Fall Prevention:   activity supervised   clutter free environment maintained   assistive device/personal items within reach   fall prevention program maintained   nonskid shoes/slippers when out of bed   room organization consistent   safety round/check completed     Problem: Adult Inpatient Plan of Care  Goal: Absence of Hospital-Acquired Illness or Injury  Intervention: Prevent Skin Injury  Recent Flowsheet Documentation  Taken 8/11/2021 1400 by Herman Glaser, RN  Body Position: position changed independently  Skin Protection:   adhesive use limited   tubing/devices free from skin contact   skin-to-device areas padded   skin-to-skin areas padded  Taken 8/11/2021 1200 by Herman Glaser, RN  Body Position:  position changed independently  Skin Protection:   adhesive use limited   skin-to-device areas padded   skin-to-skin areas padded   tubing/devices free from skin contact  Taken 8/11/2021 1000 by Herman Glaser, RN  Body Position: position changed independently  Skin Protection:   adhesive use limited   tubing/devices free from skin contact   skin-to-device areas padded   skin-to-skin areas padded  Taken 8/11/2021 0830 by Herman Glaser, RN  Body Position: position changed independently  Skin Protection:   adhesive use limited   tubing/devices free from skin contact   skin-to-device areas padded   skin-to-skin areas padded   Goal Outcome Evaluation:

## 2021-08-11 NOTE — CASE MANAGEMENT/SOCIAL WORK
Continued Stay Note  [facility]     Patient Name: Wong Alicea  MRN: [id number]  Today's Date: 8/11/2021    Admit Date: 8/10/2021    Discharge Plan     Row Name 08/11/21 1532       Plan    Plan  Home with Family    Patient/Family in Agreement with Plan  yes    Plan Comments  I have met with Mr. Alicea today to discuss his discharge plan.  He reports that he lives with his wife in [facility].  He is independent with activities of daily living and mobility.  He states that he uses a Bipap and a home nebulizer supplied by [facility].  He is S/P Laparoscopic hiatal hernia repair with mesh, Nissen fundoplication with mesh, EGD by Dr. Mcgee yesterday.  Mr. Alicea states that he plans to return home today and that his wife will provide transportation when he is ready to be discharged.  He denies having any discharge needs at this time.    Final Discharge Disposition Code  01 - home or self-care        Discharge Codes    No documentation.             Marifer Azul RN

## 2021-08-11 NOTE — PROGRESS NOTES
"Patient Name:  Wong Alicea  YOB: 1955  [id number]    Surgery Progress Note    Date of visit: 8/11/2021    Subjective   Subjective: feeling better this AM, but still some gas pain.         Objective     Objective:     /83 (BP Location: Right arm, Patient Position: Lying)   Pulse 62   Temp 96 °F (35.6 °C) (Oral)   Resp 16   Ht 188 cm (74\")   Wt 104 kg (230 lb)   SpO2 94%   BMI 29.53 kg/m²     Intake/Output Summary (Last 24 hours) at 8/11/2021 0733  Last data filed at 8/11/2021 0506  Gross per 24 hour   Intake 4895.02 ml   Output 3600 ml   Net 1295.02 ml       CV:  Rhythm  regular and rate regular   L:  Clear  to auscultation bilaterally   Abd:  Bowel sounds positive , soft, appropriately tender. Dressings c/d/i  Ext:  No cyanosis, clubbing, edema    Recent labs that are back at this time have been reviewed. CXR shows no PTX       Assessment/Plan     Assessment/ Plan:    Problem List Items Addressed This Visit        Gastrointestinal Abdominal     Paraesophageal hernia- Doing well after Lap Nissen. UGI and teaching today. D/C PCA, HLIV. Hopefully home later today.      Relevant Orders    Tissue Pathology Exam           Active Hospital Problems    Diagnosis  POA   • **Hiatal hernia [K44.9]  Yes   • Paraesophageal hernia [K44.9]  Yes   • Hypertension [I10]  Yes   • Hyperlipidemia [E78.5]  Yes      Resolved Hospital Problems   No resolved problems to display.              Ralf Mcgee MD  8/11/2021  07:33 EDT    UGI looks good. Patient tolerating diet, wants to go home.    D/C home. RTC with me in 4  weeks. No lifting > 30 lbs until RTC. May remove dressings in 24 hours, may shower at that time.    Ralf Mcgee MD  17:09 EDT    "

## 2021-08-12 NOTE — CONSULTS
Clinical Nutrition     Nutrition Education   Reason for Visit:   Physician Consult       Patient Name: Wong Alicea  YOB: 1955  MRN: [id number]  Date of Encounter: 08/11/21 22:29 EDT  Admission date: 8/10/2021      Comments:  Material presented for post fundoplication diet. Good attn. Anticipate adherence.      Applicable Diagnoses     S/p HH repair Nissen fundoplication    Diet/Nutrition Related History:     Pt allows taking stage 1 diet.      Labs reviewed   Yes    Nutrition Diagnosis     8/11  Problem Food and nutrition knowledge deficit   R/T Diet for post sgy   Signs/Symptoms Pt rpt   Status: resolved    Goal:     Increase knowledge on diet/nutrition effects on condition/status     Nutrition Intervention       Education provided regarding nutrition therapy for: Diet rationale, Key food habit change and Post Nissen Fundoplication      Education provided regarding food habits/behavior related to:Food choices     RD provided printed material via Diet After Nissen Fundoplication Surgery; Material developed by [facility] and Dr. Ralf Mcgee     Pt acknowledged understanding of material covered      Monitoring/Evaluation:     Anticipate adherence.  Follow per protocol if adm extended    Dottie Salvador RD  Time Spent: 20 min

## 2021-08-22 ENCOUNTER — APPOINTMENT (OUTPATIENT)
Dept: CT IMAGING | Facility: HOSPITAL | Age: 66
End: 2021-08-22

## 2021-08-22 ENCOUNTER — APPOINTMENT (OUTPATIENT)
Dept: GENERAL RADIOLOGY | Facility: HOSPITAL | Age: 66
End: 2021-08-22

## 2021-08-22 ENCOUNTER — HOSPITAL ENCOUNTER (OUTPATIENT)
Facility: HOSPITAL | Age: 66
Setting detail: OBSERVATION
LOS: 1 days | Discharge: HOME | End: 2021-08-24
Attending: EMERGENCY MEDICINE | Admitting: SURGERY

## 2021-08-22 DIAGNOSIS — R50.9 ACUTE FEBRILE ILLNESS: ICD-10-CM

## 2021-08-22 DIAGNOSIS — K91.89 OTHER POSTOPERATIVE COMPLICATION INVOLVING DIGESTIVE SYSTEM: Primary | ICD-10-CM

## 2021-08-22 PROBLEM — T81.9XXA COMPLICATION OF SURGICAL PROCEDURE: Status: ACTIVE | Noted: 2021-08-22

## 2021-08-22 LAB
ALBUMIN SERPL-MCNC: 3.8 G/DL (ref 3.5–5.2)
ALBUMIN/GLOB SERPL: 1 G/DL
ALP SERPL-CCNC: 99 U/L (ref 39–117)
ALT SERPL W P-5'-P-CCNC: 25 U/L (ref 1–41)
ANION GAP SERPL CALCULATED.3IONS-SCNC: 16 MMOL/L (ref 5–15)
AST SERPL-CCNC: 16 U/L (ref 1–40)
BACTERIA UR QL AUTO: ABNORMAL /HPF
BASOPHILS # BLD AUTO: 0.04 10*3/MM3 (ref 0–0.2)
BASOPHILS NFR BLD AUTO: 0.3 % (ref 0–1.5)
BILIRUB SERPL-MCNC: 2.4 MG/DL (ref 0–1.2)
BILIRUB UR QL STRIP: ABNORMAL
BUN SERPL-MCNC: 24 MG/DL (ref 8–23)
BUN/CREAT SERPL: 24.2 (ref 7–25)
CALCIUM SPEC-SCNC: 9.5 MG/DL (ref 8.6–10.5)
CHLORIDE SERPL-SCNC: 95 MMOL/L (ref 98–107)
CLARITY UR: ABNORMAL
CO2 SERPL-SCNC: 23 MMOL/L (ref 22–29)
COLOR UR: ABNORMAL
CREAT SERPL-MCNC: 0.99 MG/DL (ref 0.76–1.27)
D-LACTATE SERPL-SCNC: 1.8 MMOL/L (ref 0.5–2)
DEPRECATED RDW RBC AUTO: 43 FL (ref 37–54)
EOSINOPHIL # BLD AUTO: 0 10*3/MM3 (ref 0–0.4)
EOSINOPHIL NFR BLD AUTO: 0 % (ref 0.3–6.2)
ERYTHROCYTE [DISTWIDTH] IN BLOOD BY AUTOMATED COUNT: 12.7 % (ref 12.3–15.4)
FLUAV SUBTYP SPEC NAA+PROBE: NOT DETECTED
FLUBV RNA ISLT QL NAA+PROBE: NOT DETECTED
GFR SERPL CREATININE-BSD FRML MDRD: 76 ML/MIN/1.73
GFR SERPL CREATININE-BSD FRML MDRD: 92 ML/MIN/1.73
GLOBULIN UR ELPH-MCNC: 3.9 GM/DL
GLUCOSE SERPL-MCNC: 153 MG/DL (ref 65–99)
GLUCOSE UR STRIP-MCNC: NEGATIVE MG/DL
HCT VFR BLD AUTO: 46.8 % (ref 37.5–51)
HGB BLD-MCNC: 16.5 G/DL (ref 13–17.7)
HGB UR QL STRIP.AUTO: NEGATIVE
HYALINE CASTS UR QL AUTO: ABNORMAL /LPF
IMM GRANULOCYTES # BLD AUTO: 0.07 10*3/MM3 (ref 0–0.05)
IMM GRANULOCYTES NFR BLD AUTO: 0.5 % (ref 0–0.5)
KETONES UR QL STRIP: ABNORMAL
LEUKOCYTE ESTERASE UR QL STRIP.AUTO: ABNORMAL
LYMPHOCYTES # BLD AUTO: 1.46 10*3/MM3 (ref 0.7–3.1)
LYMPHOCYTES NFR BLD AUTO: 10.3 % (ref 19.6–45.3)
MCH RBC QN AUTO: 32.3 PG (ref 26.6–33)
MCHC RBC AUTO-ENTMCNC: 35.3 G/DL (ref 31.5–35.7)
MCV RBC AUTO: 91.6 FL (ref 79–97)
MONOCYTES # BLD AUTO: 0.87 10*3/MM3 (ref 0.1–0.9)
MONOCYTES NFR BLD AUTO: 6.1 % (ref 5–12)
NEUTROPHILS NFR BLD AUTO: 11.71 10*3/MM3 (ref 1.7–7)
NEUTROPHILS NFR BLD AUTO: 82.8 % (ref 42.7–76)
NITRITE UR QL STRIP: POSITIVE
NRBC BLD AUTO-RTO: 0 /100 WBC (ref 0–0.2)
PH UR STRIP.AUTO: 5.5 [PH] (ref 5–8)
PLATELET # BLD AUTO: 334 10*3/MM3 (ref 140–450)
PMV BLD AUTO: 11.1 FL (ref 6–12)
POTASSIUM SERPL-SCNC: 3.7 MMOL/L (ref 3.5–5.2)
PROCALCITONIN SERPL-MCNC: 0.4 NG/ML (ref 0–0.25)
PROT SERPL-MCNC: 7.7 G/DL (ref 6–8.5)
PROT UR QL STRIP: ABNORMAL
RBC # BLD AUTO: 5.11 10*6/MM3 (ref 4.14–5.8)
RBC # UR: ABNORMAL /HPF
REF LAB TEST METHOD: ABNORMAL
SARS-COV-2 RNA PNL SPEC NAA+PROBE: NOT DETECTED
SODIUM SERPL-SCNC: 134 MMOL/L (ref 136–145)
SP GR UR STRIP: 1.03 (ref 1–1.03)
SQUAMOUS #/AREA URNS HPF: ABNORMAL /HPF
UROBILINOGEN UR QL STRIP: ABNORMAL
WBC # BLD AUTO: 14.15 10*3/MM3 (ref 3.4–10.8)
WBC UR QL AUTO: ABNORMAL /HPF

## 2021-08-22 PROCEDURE — C9803 HOPD COVID-19 SPEC COLLECT: HCPCS

## 2021-08-22 PROCEDURE — 96365 THER/PROPH/DIAG IV INF INIT: CPT

## 2021-08-22 PROCEDURE — 25010000002 IOPAMIDOL 61 % SOLUTION: Performed by: EMERGENCY MEDICINE

## 2021-08-22 PROCEDURE — 80053 COMPREHEN METABOLIC PANEL: CPT | Performed by: PHYSICIAN ASSISTANT

## 2021-08-22 PROCEDURE — 81001 URINALYSIS AUTO W/SCOPE: CPT | Performed by: PHYSICIAN ASSISTANT

## 2021-08-22 PROCEDURE — 84145 PROCALCITONIN (PCT): CPT | Performed by: PHYSICIAN ASSISTANT

## 2021-08-22 PROCEDURE — 74240 X-RAY XM UPR GI TRC 1CNTRST: CPT

## 2021-08-22 PROCEDURE — 0 DIATRIZOATE MEGLUMINE & SODIUM PER 1 ML: Performed by: EMERGENCY MEDICINE

## 2021-08-22 PROCEDURE — 25010000002 PIPERACILLIN SOD-TAZOBACTAM PER 1 G: Performed by: PHYSICIAN ASSISTANT

## 2021-08-22 PROCEDURE — 83605 ASSAY OF LACTIC ACID: CPT | Performed by: PHYSICIAN ASSISTANT

## 2021-08-22 PROCEDURE — 74177 CT ABD & PELVIS W/CONTRAST: CPT

## 2021-08-22 PROCEDURE — 71045 X-RAY EXAM CHEST 1 VIEW: CPT

## 2021-08-22 PROCEDURE — 96375 TX/PRO/DX INJ NEW DRUG ADDON: CPT

## 2021-08-22 PROCEDURE — 99284 EMERGENCY DEPT VISIT MOD MDM: CPT

## 2021-08-22 PROCEDURE — 25010000002 PIPERACILLIN SOD-TAZOBACTAM PER 1 G: Performed by: SURGERY

## 2021-08-22 PROCEDURE — 94640 AIRWAY INHALATION TREATMENT: CPT

## 2021-08-22 PROCEDURE — 96361 HYDRATE IV INFUSION ADD-ON: CPT

## 2021-08-22 PROCEDURE — 85025 COMPLETE CBC W/AUTO DIFF WBC: CPT | Performed by: PHYSICIAN ASSISTANT

## 2021-08-22 PROCEDURE — 87636 SARSCOV2 & INF A&B AMP PRB: CPT | Performed by: PHYSICIAN ASSISTANT

## 2021-08-22 RX ORDER — SODIUM CHLORIDE, SODIUM LACTATE, POTASSIUM CHLORIDE, CALCIUM CHLORIDE 600; 310; 30; 20 MG/100ML; MG/100ML; MG/100ML; MG/100ML
50 INJECTION, SOLUTION INTRAVENOUS CONTINUOUS
Status: DISCONTINUED | OUTPATIENT
Start: 2021-08-22 | End: 2021-08-24 | Stop reason: HOSPADM

## 2021-08-22 RX ORDER — BUDESONIDE AND FORMOTEROL FUMARATE DIHYDRATE 80; 4.5 UG/1; UG/1
2 AEROSOL RESPIRATORY (INHALATION) DAILY
Status: DISCONTINUED | OUTPATIENT
Start: 2021-08-22 | End: 2021-08-22 | Stop reason: SDUPTHER

## 2021-08-22 RX ORDER — NALOXONE HCL 0.4 MG/ML
0.4 VIAL (ML) INJECTION
Status: DISCONTINUED | OUTPATIENT
Start: 2021-08-22 | End: 2021-08-24 | Stop reason: HOSPADM

## 2021-08-22 RX ORDER — BUPROPION HYDROCHLORIDE 150 MG/1
150 TABLET ORAL DAILY
Status: DISCONTINUED | OUTPATIENT
Start: 2021-08-23 | End: 2021-08-24 | Stop reason: HOSPADM

## 2021-08-22 RX ORDER — MORPHINE SULFATE 2 MG/ML
2 INJECTION, SOLUTION INTRAMUSCULAR; INTRAVENOUS
Status: DISCONTINUED | OUTPATIENT
Start: 2021-08-22 | End: 2021-08-24 | Stop reason: HOSPADM

## 2021-08-22 RX ORDER — METOPROLOL TARTRATE 5 MG/5ML
5 INJECTION INTRAVENOUS EVERY 6 HOURS SCHEDULED
Status: DISCONTINUED | OUTPATIENT
Start: 2021-08-22 | End: 2021-08-22

## 2021-08-22 RX ORDER — METOPROLOL SUCCINATE 25 MG/1
25 TABLET, EXTENDED RELEASE ORAL
Status: DISCONTINUED | OUTPATIENT
Start: 2021-08-22 | End: 2021-08-24 | Stop reason: HOSPADM

## 2021-08-22 RX ORDER — HEPARIN SODIUM 5000 [USP'U]/ML
5000 INJECTION, SOLUTION INTRAVENOUS; SUBCUTANEOUS EVERY 8 HOURS SCHEDULED
Status: DISCONTINUED | OUTPATIENT
Start: 2021-08-23 | End: 2021-08-24 | Stop reason: HOSPADM

## 2021-08-22 RX ORDER — AMITRIPTYLINE HYDROCHLORIDE 25 MG/1
25 TABLET, FILM COATED ORAL NIGHTLY
Status: DISCONTINUED | OUTPATIENT
Start: 2021-08-22 | End: 2021-08-24 | Stop reason: HOSPADM

## 2021-08-22 RX ORDER — BUDESONIDE AND FORMOTEROL FUMARATE DIHYDRATE 80; 4.5 UG/1; UG/1
2 AEROSOL RESPIRATORY (INHALATION)
Status: DISCONTINUED | OUTPATIENT
Start: 2021-08-22 | End: 2021-08-24 | Stop reason: HOSPADM

## 2021-08-22 RX ORDER — PANTOPRAZOLE SODIUM 40 MG/10ML
40 INJECTION, POWDER, LYOPHILIZED, FOR SOLUTION INTRAVENOUS
Status: DISCONTINUED | OUTPATIENT
Start: 2021-08-22 | End: 2021-08-24 | Stop reason: HOSPADM

## 2021-08-22 RX ORDER — GABAPENTIN 300 MG/1
600 CAPSULE ORAL EVERY 8 HOURS SCHEDULED
Status: COMPLETED | OUTPATIENT
Start: 2021-08-22 | End: 2021-08-23

## 2021-08-22 RX ORDER — MONTELUKAST SODIUM 10 MG/1
10 TABLET ORAL NIGHTLY
Status: DISCONTINUED | OUTPATIENT
Start: 2021-08-22 | End: 2021-08-24 | Stop reason: HOSPADM

## 2021-08-22 RX ADMIN — METOPROLOL SUCCINATE 25 MG: 25 TABLET, EXTENDED RELEASE ORAL at 18:39

## 2021-08-22 RX ADMIN — SODIUM CHLORIDE, POTASSIUM CHLORIDE, SODIUM LACTATE AND CALCIUM CHLORIDE 100 ML/HR: 600; 310; 30; 20 INJECTION, SOLUTION INTRAVENOUS at 15:16

## 2021-08-22 RX ADMIN — AMITRIPTYLINE HYDROCHLORIDE 25 MG: 25 TABLET, FILM COATED ORAL at 22:47

## 2021-08-22 RX ADMIN — DIATRIZOATE MEGLUMINE AND DIATRIZOATE SODIUM 60 ML: 660; 100 LIQUID ORAL; RECTAL at 13:55

## 2021-08-22 RX ADMIN — TAZOBACTAM SODIUM AND PIPERACILLIN SODIUM 3.38 G: 375; 3 INJECTION, SOLUTION INTRAVENOUS at 18:39

## 2021-08-22 RX ADMIN — SODIUM CHLORIDE 1000 ML: 9 INJECTION, SOLUTION INTRAVENOUS at 11:29

## 2021-08-22 RX ADMIN — PANTOPRAZOLE SODIUM 40 MG: 40 INJECTION, POWDER, FOR SOLUTION INTRAVENOUS at 18:39

## 2021-08-22 RX ADMIN — BUDESONIDE AND FORMOTEROL FUMARATE DIHYDRATE 2 PUFF: 80; 4.5 AEROSOL RESPIRATORY (INHALATION) at 14:40

## 2021-08-22 RX ADMIN — MONTELUKAST SODIUM 10 MG: 10 TABLET, COATED ORAL at 21:03

## 2021-08-22 RX ADMIN — ROPINIROLE HYDROCHLORIDE 5 MG: 2 TABLET, FILM COATED ORAL at 22:47

## 2021-08-22 RX ADMIN — IOPAMIDOL 100 ML: 612 INJECTION, SOLUTION INTRAVENOUS at 10:09

## 2021-08-22 RX ADMIN — SODIUM CHLORIDE 1000 ML: 9 INJECTION, SOLUTION INTRAVENOUS at 09:59

## 2021-08-22 RX ADMIN — TAZOBACTAM SODIUM AND PIPERACILLIN SODIUM 3.38 G: 375; 3 INJECTION, SOLUTION INTRAVENOUS at 11:29

## 2021-08-22 RX ADMIN — GABAPENTIN 600 MG: 300 CAPSULE ORAL at 22:47

## 2021-08-22 NOTE — ED PROVIDER NOTES
Subjective   66 male presents emergency department today complaining of having a fever for the past 3 days.  He had a  Nissen fundoplication done by Dr. Mcgee on 8/10/2021.  He reports he supposed be on a liquid diet for 2 weeks he is treated a couple times and had a few slices of ham.  He reports that he has had no upper respiratory congestion he had no dysuria, frequency, urgency.  He reports he has some discomfort but is not been severe.  He has a history of COPD states is really not been coughing more than normal he does not smoke since his surgery.  Seems that his fevers occur mostly at night get up to about 102 that he has a lot of diaphoresis with this.  Denies any melena hematochezia hematemesis.      History provided by:  Patient   used: No    Fever  Max temp prior to arrival:  102.5  Temp source:  Oral  Duration:  3 days  Timing:  Intermittent  Progression:  Waxing and waning  Chronicity:  New  Relieved by:  Acetaminophen  Worsened by:  Nothing  Ineffective treatments:  None tried  Associated symptoms: chills    Associated symptoms: no chest pain, no confusion, no congestion, no cough, no diarrhea, no dysuria, no ear pain, no headaches, no myalgias, no rhinorrhea, no somnolence and no vomiting    Risk factors: no contaminated food, no contaminated water, no hx of cancer, no immunosuppression, no occupational exposure and no recent sickness        Review of Systems   Constitutional: Positive for chills and fever.   HENT: Negative for congestion, ear pain and rhinorrhea.    Respiratory: Negative for cough, chest tightness and wheezing.    Cardiovascular: Negative for chest pain and palpitations.   Gastrointestinal: Negative for diarrhea and vomiting.   Genitourinary: Negative for dysuria, frequency and urgency.   Musculoskeletal: Negative for myalgias.   Neurological: Negative for headaches.   Hematological: Negative for adenopathy.   Psychiatric/Behavioral: Negative.  Negative for  confusion.   All other systems reviewed and are negative.      Past Medical History:   Diagnosis Date   • Aneurysm (CMS/HCC)    • Arthritis    • Black lung disease (CMS/HCC)    • Cataract    • COPD (chronic obstructive pulmonary disease) (CMS/HCC)    • Depression    • Full dentures    • GERD (gastroesophageal reflux disease)    • Hiatal hernia    • Hypertension    • Obstructive sleep apnea treated with BiPAP    • Renal cyst    • Sleep apnea    • Thoracic aortic aneurysm (CMS/HCC)        Allergies   Allergen Reactions   • Other    • Codeine Anxiety   • Erythromycin Rash   • Niacin Rash   • Sulfa Antibiotics Rash   • Wheat Bran Rash       Past Surgical History:   Procedure Laterality Date   • CARPAL TUNNEL RELEASE     • CATARACT EXTRACTION Bilateral     cataract surgery    • CHOLECYSTECTOMY     • COLONOSCOPY  2019   • ESOPHAGEAL MOTILITY STUDY N/A 6/18/2021    Procedure: MANOMETRY/PH;  Surgeon: Ralf Mcgee MD;  Location:  [facility];  Service: General;  Laterality: N/A;  procedure completed successfully.   • GASTRIC PH MONITORING 24HR N/A 6/18/2021    Procedure: PH;  Surgeon: Ralf Mcgee MD;  Location:  [facility];  Service: General;  Laterality: N/A;  Probe passed successfully; secured @ 34 cm. LES @ 43.8 cm.   • HAND SURGERY     • JOINT REPLACEMENT     • NISSEN FUNDOPLICATION N/A 8/10/2021    Procedure: NISSEN LAPAROSCOPIC WITH EGD;  Surgeon: Ralf Mcgee MD;  Location:  [facility];  Service: General;  Laterality: N/A;   • REPLACEMENT TOTAL KNEE     • SHOULDER SURGERY     • SINUS SURGERY     • VEIN LACERATION REPAIR      OPEN LEFT KNEE SURGERY FOR VEIN REPAIR       Family History   Problem Relation Age of Onset   • Heart failure Mother    • Coronary artery disease Father    • Lung disease Father        Social History     Socioeconomic History   • Marital status:      Spouse name: Not on file   • Number of children: 3   • Years of education: Not on file   • Highest education level:  Not on file   Tobacco Use   • Smoking status: Current Every Day Smoker     Packs/day: 0.50     Years: 20.00     Pack years: 10.00     Types: Cigars   • Smokeless tobacco: Former User     Types: Chew     Quit date: 1998   • Tobacco comment: smoking 8 cigars a day   Vaping Use   • Vaping Use: Never used   Substance and Sexual Activity   • Alcohol use: No   • Drug use: No   • Sexual activity: Defer           Objective   Physical Exam  Vitals and nursing note reviewed.   Constitutional:       Appearance: He is well-developed.   HENT:      Head: Normocephalic and atraumatic.      Right Ear: External ear normal.      Left Ear: External ear normal.      Nose: Nose normal.   Eyes:      General: No scleral icterus.     Conjunctiva/sclera: Conjunctivae normal.      Pupils: Pupils are equal, round, and reactive to light.   Neck:      Thyroid: No thyromegaly.   Cardiovascular:      Rate and Rhythm: Normal rate and regular rhythm.      Heart sounds: Normal heart sounds.   Pulmonary:      Effort: Pulmonary effort is normal. No respiratory distress.      Breath sounds: Normal breath sounds. No wheezing or rales.   Chest:      Chest wall: No tenderness.   Abdominal:      General: Bowel sounds are normal. There is no distension.      Palpations: Abdomen is soft.      Tenderness: There is no abdominal tenderness.      Comments: Laparoscopic sites are healing well there is no redness or induration.  Very minimal tenderness to palpation.   Musculoskeletal:         General: Normal range of motion.      Cervical back: Normal range of motion.   Lymphadenopathy:      Cervical: No cervical adenopathy.   Skin:     General: Skin is warm and dry.   Neurological:      Mental Status: He is alert and oriented to person, place, and time.      Cranial Nerves: No cranial nerve deficit.      Coordination: Coordination normal.      Deep Tendon Reflexes: Reflexes are normal and symmetric. Reflexes normal.   Psychiatric:         Behavior: Behavior  normal.         Thought Content: Thought content normal.         Judgment: Judgment normal.         Procedures           ED Course  ED Course as of Aug 22 1745   Sun Aug 22, 2021   1121 Spoke to  general surgeon on-call for Dr. Mcgee.  He has been going to start IV antibiotics if the hospital would admit he is can come see the patient in the records as well as the CT scan himself.    [ESTHER]   1122 I discussed the findings with the patient and his family.    [ESTHER]   1153 Dr. Guan called back and states he will admit and didn't need the hospitalist to do so.     [ESTHER]      ED Course User Index  [ESTHER] Farhat Salas PA                                   Recent Results (from the past 24 hour(s))   Comprehensive Metabolic Panel    Collection Time: 08/22/21  9:37 AM    Specimen: Blood   Result Value Ref Range    Glucose 153 (H) 65 - 99 mg/dL    BUN 24 (H) 8 - 23 mg/dL    Creatinine 0.99 0.76 - 1.27 mg/dL    Sodium 134 (L) 136 - 145 mmol/L    Potassium 3.7 3.5 - 5.2 mmol/L    Chloride 95 (L) 98 - 107 mmol/L    CO2 23.0 22.0 - 29.0 mmol/L    Calcium 9.5 8.6 - 10.5 mg/dL    Total Protein 7.7 6.0 - 8.5 g/dL    Albumin 3.80 3.50 - 5.20 g/dL    ALT (SGPT) 25 1 - 41 U/L    AST (SGOT) 16 1 - 40 U/L    Alkaline Phosphatase 99 39 - 117 U/L    Total Bilirubin 2.4 (H) 0.0 - 1.2 mg/dL    eGFR Non African Amer 76 >60 mL/min/1.73    eGFR  African Amer 92 >60 mL/min/1.73    Globulin 3.9 gm/dL    A/G Ratio 1.0 g/dL    BUN/Creatinine Ratio 24.2 7.0 - 25.0    Anion Gap 16.0 (H) 5.0 - 15.0 mmol/L   Procalcitonin    Collection Time: 08/22/21  9:37 AM    Specimen: Blood   Result Value Ref Range    Procalcitonin 0.40 (H) 0.00 - 0.25 ng/mL   Lactic Acid, Plasma    Collection Time: 08/22/21  9:37 AM    Specimen: Blood   Result Value Ref Range    Lactate 1.8 0.5 - 2.0 mmol/L   CBC Auto Differential    Collection Time: 08/22/21  9:37 AM    Specimen: Blood   Result Value Ref Range    WBC 14.15 (H) 3.40 - 10.80 10*3/mm3    RBC 5.11 4.14 -  5.80 10*6/mm3    Hemoglobin 16.5 13.0 - 17.7 g/dL    Hematocrit 46.8 37.5 - 51.0 %    MCV 91.6 79.0 - 97.0 fL    MCH 32.3 26.6 - 33.0 pg    MCHC 35.3 31.5 - 35.7 g/dL    RDW 12.7 12.3 - 15.4 %    RDW-SD 43.0 37.0 - 54.0 fl    MPV 11.1 6.0 - 12.0 fL    Platelets 334 140 - 450 10*3/mm3    Neutrophil % 82.8 (H) 42.7 - 76.0 %    Lymphocyte % 10.3 (L) 19.6 - 45.3 %    Monocyte % 6.1 5.0 - 12.0 %    Eosinophil % 0.0 (L) 0.3 - 6.2 %    Basophil % 0.3 0.0 - 1.5 %    Immature Grans % 0.5 0.0 - 0.5 %    Neutrophils, Absolute 11.71 (H) 1.70 - 7.00 10*3/mm3    Lymphocytes, Absolute 1.46 0.70 - 3.10 10*3/mm3    Monocytes, Absolute 0.87 0.10 - 0.90 10*3/mm3    Eosinophils, Absolute 0.00 0.00 - 0.40 10*3/mm3    Basophils, Absolute 0.04 0.00 - 0.20 10*3/mm3    Immature Grans, Absolute 0.07 (H) 0.00 - 0.05 10*3/mm3    nRBC 0.0 0.0 - 0.2 /100 WBC   COVID-19 and FLU A/B PCR - Swab, Nasopharynx    Collection Time: 08/22/21  9:42 AM    Specimen: Nasopharynx; Swab   Result Value Ref Range    COVID19 Not Detected Not Detected - Ref. Range    Influenza A PCR Not Detected Not Detected    Influenza B PCR Not Detected Not Detected   Urinalysis With Microscopic If Indicated (No Culture) - Urine, Clean Catch    Collection Time: 08/22/21  9:54 AM    Specimen: Urine, Clean Catch   Result Value Ref Range    Color, UA Orange (A) Yellow, Straw    Appearance, UA Cloudy (A) Clear    pH, UA 5.5 5.0 - 8.0    Specific Gravity, UA 1.035 (H) 1.001 - 1.030    Glucose, UA Negative Negative    Ketones, UA Trace (A) Negative    Bilirubin, UA Moderate (2+) (A) Negative    Blood, UA Negative Negative    Protein, UA >=300 mg/dL (3+) (A) Negative    Leuk Esterase, UA Small (1+) (A) Negative    Nitrite, UA Positive (A) Negative    Urobilinogen, UA 2.0 E.U./dL (A) 0.2 - 1.0 E.U./dL   Urinalysis, Microscopic Only - Urine, Clean Catch    Collection Time: 08/22/21  9:54 AM    Specimen: Urine, Clean Catch   Result Value Ref Range    RBC, UA 3-6 (A) None Seen, 0-2 /HPF     WBC, UA 3-5 (A) None Seen, 0-2 /HPF    Bacteria, UA None Seen None Seen, Trace /HPF    Squamous Epithelial Cells, UA 0-2 None Seen, 0-2 /HPF    Hyaline Casts, UA 7-12 0 - 6 /LPF    Methodology Automated Microscopy      Note: In addition to lab results from this visit, the labs listed above may include labs taken at another facility or during a different encounter within the last 24 hours. Please correlate lab times with ED admission and discharge times for further clarification of the services performed during this visit.    FL Upper GI Water Soluble   Final Result   Tapering and moderate long segment narrowing of the GE junction likely   reflecting surrounding edema. There is otherwise no reyes extravasation   of oral contrast.       This report was finalized on 8/22/2021 3:08 PM by Florian Gregg.          XR Chest 1 View   Final Result   Stable chronic changes from prior exams, without evidence of   acute airspace disease or other acute cardiopulmonary abnormality.       This report was finalized on 8/22/2021 11:18 AM by Florian Gregg.          CT Abdomen Pelvis With Contrast   Final Result   Postoperative changes from recent fundoplication. There is   extraluminal air and fluid surrounding the operative site compatible   with leak/dehiscence. No additional acute findings in the abdomen and   pelvis.       Findings discussed with Dr. Salas of the emergency Department by   Florian Gregg via phone at 10:39 AM 8/22/2021       This report was finalized on 8/22/2021 10:36 AM by Florian Gregg.            Vitals:    08/22/21 1440 08/22/21 1520 08/22/21 1600 08/22/21 1700   BP:  128/75     BP Location:  Left arm     Patient Position:  Lying     Pulse: 85 83 81 79   Resp: 16 18     Temp:  98.7 °F (37.1 °C)     TempSrc:  Oral     SpO2:       Weight:       Height:         Medications   budesonide-formoterol (SYMBICORT) 80-4.5 MCG/ACT inhaler 2 puff (2 puffs Inhalation Given 8/22/21 1440)   heparin (porcine)  5000 UNIT/ML injection 5,000 Units (has no administration in time range)   lactated ringers infusion (100 mL/hr Intravenous New Bag 8/22/21 1516)   morphine injection 2 mg (has no administration in time range)     And   naloxone (NARCAN) injection 0.4 mg (has no administration in time range)   pantoprazole (PROTONIX) injection 40 mg (has no administration in time range)   piperacillin-tazobactam (ZOSYN) 3.375 g in iso-osmotic dextrose 50 ml (premix) (has no administration in time range)   metoprolol succinate XL (TOPROL-XL) 24 hr tablet 25 mg (has no administration in time range)   montelukast (SINGULAIR) tablet 10 mg (has no administration in time range)   buPROPion XL (WELLBUTRIN XL) 24 hr tablet 150 mg (has no administration in time range)   sodium chloride 0.9 % bolus 1,000 mL (0 mL Intravenous Stopped 8/22/21 1029)   iopamidol (ISOVUE-300) 61 % injection 100 mL (100 mL Intravenous Given 8/22/21 1009)   piperacillin-tazobactam (ZOSYN) 3.375 g in iso-osmotic dextrose 50 ml (premix) (0 g Intravenous Stopped 8/22/21 1159)   sodium chloride 0.9 % bolus 1,000 mL (0 mL Intravenous Stopped 8/22/21 1159)   diatrizoate meglumine-sodium (GASTROGRAFIN) 66-10 % solution 120 mL (60 mL Oral Given 8/22/21 1355)     ECG/EMG Results (last 24 hours)     ** No results found for the last 24 hours. **        No orders to display               MDM  Number of Diagnoses or Management Options  Acute febrile illness: new and requires workup  Other postoperative complication involving digestive system: new and requires workup     Amount and/or Complexity of Data Reviewed  Clinical lab tests: reviewed and ordered  Tests in the radiology section of CPT®: reviewed and ordered  Tests in the medicine section of CPT®: reviewed and ordered  Discuss the patient with other providers: yes    Patient Progress  Patient progress: stable      Final diagnoses:   Acute febrile illness   Other postoperative complication involving digestive system       ED  Disposition  ED Disposition     ED Disposition Condition Comment    Decision to Admit  Level of Care: Telemetry [5]   Diagnosis: Other postoperative complication involving digestive system [[id number]]   Admitting Physician: CHIQUITA EBNAVIDES [[id number]]   Isolate for COVID?: No [0]   Certification: I Certify That Inpatient Hospital Services Are Medically Necessary For Greater Than 2 Midnights            No follow-up provider specified.       Medication List      No changes were made to your prescriptions during this visit.          Farhat Salas PA  08/22/21 1235

## 2021-08-22 NOTE — H&P
General Surgery History and Physical Note    Date of Service: 8/22/2021  [facility]  [id number]  1955      Referring Provider: Sim Sneed MD    Location of Consult: Emergency Department     Reason for Consultation: fever after surgery    History of Present Illness:  I am seeing, Wong Alicea, in consultation at request of Sim Sneed MD regarding fever in the postoperative.  He is a 66-year-old gentleman with history of arthritis, COPD, GERD, hypertension, obstructive sleep apnea, and thoracic aortic aneurysm who underwent laparoscopic hiatal hernia repair with mesh and Nissen fundoplication on August 10 Dr. Mcgee who presents to [facility] emergency department with complaints of fever.  He reports that he has generally not felt well and been slow to make progress since surgery.  He was discharged on Thursday and reports that he has been slow to make progress.  He reports decreased appetite and decreased energy with easy fatigability.  He has had some nausea, but reports that this has been ongoing since surgery and has not acutely worsened over the last several days.  He had one episode of emesis yesterday, but this was small volume and he has not had recurrent episodes of vomiting.  He reports a little bit of shortness of breath but this has been ongoing for many years and not worsened.  He has had some generalized tenderness across his upper abdomen and what he describes as gas pain with some subjective distention.  His abdominal pain is not worsening.  He has had bowel movements and surgery with his last one yesterday.  He has tolerated liquids without difficulty, had an Ensure and several Gatorade yesterday, but has not eaten much due to generalized decreased appetite.  Over the last 3 to 4 days he has noted subjective fevers and chills.  He measured a fever as high as 102.4 yesterday.  Because of this he came to the emergency department this morning.    Problems Addressed this Visit         Symptoms and Signs    Complication of surgical procedure - Primary      Other Visit Diagnoses     Acute febrile illness          Diagnoses       Codes Comments    Other postoperative complication involving digestive system    -  Primary ICD-10-CM: K91.89  ICD-9-CM: 997.49     Acute febrile illness     ICD-10-CM: R50.9  ICD-9-CM: 780.60           PMHx:  Past Medical History:   Diagnosis Date   • Aneurysm (CMS/HCC)    • Arthritis    • Black lung disease (CMS/Formerly [facility])    • Cataract    • COPD (chronic obstructive pulmonary disease) (CMS/Formerly [facility])    • Depression    • Full dentures    • GERD (gastroesophageal reflux disease)    • Hiatal hernia    • Hypertension    • Obstructive sleep apnea treated with BiPAP    • Renal cyst    • Sleep apnea    • Thoracic aortic aneurysm (CMS/Formerly [facility])        Past Surgical History:  Past Surgical History:   • CARPAL TUNNEL RELEASE   • CATARACT EXTRACTION    cataract surgery    • CHOLECYSTECTOMY   • COLONOSCOPY   • ESOPHAGEAL MOTILITY STUDY    Procedure: MANOMETRY/PH;  Surgeon: Ralf Mcgee MD;  Location:  [facility];  Service: General;  Laterality: N/A;  procedure completed successfully.   • GASTRIC PH MONITORING 24HR    Procedure: PH;  Surgeon: Ralf Mcgee MD;  Location:  [facility];  Service: General;  Laterality: N/A;  Probe passed successfully; secured @ 34 cm. LES @ 43.8 cm.   • HAND SURGERY   • JOINT REPLACEMENT   • NISSEN FUNDOPLICATION    Procedure: NISSEN LAPAROSCOPIC WITH EGD;  Surgeon: Ralf Mcgee MD;  Location:  [facility];  Service: General;  Laterality: N/A;   • REPLACEMENT TOTAL KNEE   • SHOULDER SURGERY   • SINUS SURGERY   • VEIN LACERATION REPAIR    OPEN LEFT KNEE SURGERY FOR VEIN REPAIR       Allergies:  Allergies   Allergen Reactions   • Other    • Codeine Anxiety   • Erythromycin Rash   • Niacin Rash   • Sulfa Antibiotics Rash   • Wheat Bran Rash       Medications:  No current facility-administered medications on file prior to encounter.     Current  Outpatient Medications on File Prior to Encounter   Medication Sig Dispense Refill   • amitriptyline (ELAVIL) 25 MG tablet Take 25 mg by mouth every night at bedtime.     • Budesonide-Formoterol Fumarate (SYMBICORT IN) Inhale Daily.     • buPROPion XL (WELLBUTRIN XL) 150 MG 24 hr tablet Daily.     • ciclesonide (OMNARIS) 50 MCG/ACT nasal spray 2 sprays by Each Nare route Daily.     • diclofenac (VOLTAREN) 75 MG EC tablet Take 75 mg by mouth 2 (Two) Times a Day.     • docusate sodium (COLACE) 150 MG/15ML liquid Take 10 mL by mouth Daily. 200 mL 0   • esomeprazole (nexIUM) 40 MG capsule Take 40 mg by mouth Every Morning Before Breakfast.     • ezetimibe (ZETIA) 10 MG tablet Take 10 mg by mouth Daily.     • famotidine (PEPCID) 40 MG tablet Take 40 mg by mouth At Night As Needed for Indigestion or Heartburn.     • gabapentin (NEURONTIN) 600 MG tablet Take 600 mg by mouth 3 (Three) Times a Day.     • hydroCHLOROthiazide (HYDRODIURIL) 12.5 MG tablet Take 12.5 mg by mouth Every Morning.     • levocetirizine (XYZAL) 5 MG tablet Take 5 mg by mouth Every Evening.     • lidocaine (LIDODERM) 5 % As Needed.     • lisinopril (PRINIVIL,ZESTRIL) 10 MG tablet Take 10 mg by mouth daily.     • Magnesium 400 MG tablet Take 400 mg by mouth Daily.     • metoprolol succinate XL (TOPROL-XL) 25 MG 24 hr tablet Take 25 mg by mouth Daily.     • metroNIDAZOLE (FLAGYL PO) Take 500 mg by mouth 2 (two) times a day.     • montelukast (SINGULAIR) 10 MG tablet Take 10 mg by mouth Every Night.     • promethazine (PHENERGAN) 6.25 MG/5ML syrup Take 10 mL by mouth Every 6 (Six) Hours As Needed for Nausea or Vomiting. 300 mL 0   • rOPINIRole (REQUIP) 5 MG tablet Take 5 mg by mouth 2 (Two) Times a Day.     • rosuvastatin (CRESTOR) 20 MG tablet Take 20 mg by mouth Daily.     • sildenafil (VIAGRA) 100 MG tablet Take 100 mg by mouth Daily As Needed.     • SYMBICORT 80-4.5 MCG/ACT inhaler Daily.     • tiZANidine (ZANAFLEX) 4 MG tablet Take 4 mg by mouth 2  (two) times a day.     • vitamin D (ERGOCALCIFEROL) 1.25 MG (99894 UT) capsule capsule 1 (One) Time Per Week. Sunday           Current Facility-Administered Medications:   •  budesonide-formoterol (SYMBICORT) 80-4.5 MCG/ACT inhaler 2 puff, 2 puff, Inhalation, Daily, Babak Guan MD  •  budesonide-formoterol (SYMBICORT) 80-4.5 MCG/ACT inhaler 2 puff, 2 puff, Inhalation, Daily, Babak Guan MD  •  piperacillin-tazobactam (ZOSYN) 3.375 g in iso-osmotic dextrose 50 ml (premix), 3.375 g, Intravenous, Q6H, Babak Guan MD    Current Outpatient Medications:   •  amitriptyline (ELAVIL) 25 MG tablet, Take 25 mg by mouth every night at bedtime., Disp: , Rfl:   •  Budesonide-Formoterol Fumarate (SYMBICORT IN), Inhale Daily., Disp: , Rfl:   •  buPROPion XL (WELLBUTRIN XL) 150 MG 24 hr tablet, Daily., Disp: , Rfl:   •  ciclesonide (OMNARIS) 50 MCG/ACT nasal spray, 2 sprays by Each Nare route Daily., Disp: , Rfl:   •  diclofenac (VOLTAREN) 75 MG EC tablet, Take 75 mg by mouth 2 (Two) Times a Day., Disp: , Rfl:   •  docusate sodium (COLACE) 150 MG/15ML liquid, Take 10 mL by mouth Daily., Disp: 200 mL, Rfl: 0  •  esomeprazole (nexIUM) 40 MG capsule, Take 40 mg by mouth Every Morning Before Breakfast., Disp: , Rfl:   •  ezetimibe (ZETIA) 10 MG tablet, Take 10 mg by mouth Daily., Disp: , Rfl:   •  famotidine (PEPCID) 40 MG tablet, Take 40 mg by mouth At Night As Needed for Indigestion or Heartburn., Disp: , Rfl:   •  gabapentin (NEURONTIN) 600 MG tablet, Take 600 mg by mouth 3 (Three) Times a Day., Disp: , Rfl:   •  hydroCHLOROthiazide (HYDRODIURIL) 12.5 MG tablet, Take 12.5 mg by mouth Every Morning., Disp: , Rfl:   •  levocetirizine (XYZAL) 5 MG tablet, Take 5 mg by mouth Every Evening., Disp: , Rfl:   •  lidocaine (LIDODERM) 5 %, As Needed., Disp: , Rfl:   •  lisinopril (PRINIVIL,ZESTRIL) 10 MG tablet, Take 10 mg by mouth daily., Disp: , Rfl:   •  Magnesium 400 MG tablet, Take 400 mg by mouth Daily., Disp: , Rfl:  "  •  metoprolol succinate XL (TOPROL-XL) 25 MG 24 hr tablet, Take 25 mg by mouth Daily., Disp: , Rfl:   •  metroNIDAZOLE (FLAGYL PO), Take 500 mg by mouth 2 (two) times a day., Disp: , Rfl:   •  montelukast (SINGULAIR) 10 MG tablet, Take 10 mg by mouth Every Night., Disp: , Rfl:   •  promethazine (PHENERGAN) 6.25 MG/5ML syrup, Take 10 mL by mouth Every 6 (Six) Hours As Needed for Nausea or Vomiting., Disp: 300 mL, Rfl: 0  •  rOPINIRole (REQUIP) 5 MG tablet, Take 5 mg by mouth 2 (Two) Times a Day., Disp: , Rfl:   •  rosuvastatin (CRESTOR) 20 MG tablet, Take 20 mg by mouth Daily., Disp: , Rfl:   •  sildenafil (VIAGRA) 100 MG tablet, Take 100 mg by mouth Daily As Needed., Disp: , Rfl:   •  SYMBICORT 80-4.5 MCG/ACT inhaler, Daily., Disp: , Rfl:   •  tiZANidine (ZANAFLEX) 4 MG tablet, Take 4 mg by mouth 2 (two) times a day., Disp: , Rfl:   •  vitamin D (ERGOCALCIFEROL) 1.25 MG (72740 UT) capsule capsule, 1 (One) Time Per Week. Sunday, Disp: , Rfl:       Family History:  Family History   Problem Relation Age of Onset   • Heart failure Mother    • Coronary artery disease Father    • Lung disease Father        Social History: Pt lives in [place].    Tobacco use: Denies     EtOH use : Denies    Illicit drug use: Denies       Review of Systems:   Constitutional: No fevers, chills or malaise   Eyes: Denies visual changes    Cardiovascular: Denies chest pain, palpitations   Pulmonary: Denies cough or shortness of breath   Abdominal/ GI: See HPI    Genitourinary: Denies dysuria or hematuria   Musculoskeletal: Denies any but chronic joint aches, pains or deformities   Psychiatric: No recent mood changes   Neurologic: No paresthesias or loss of function    /62   Pulse 79   Temp 99.6 °F (37.6 °C) (Oral)   Resp 16   Ht 188 cm (74\")   Wt 97.5 kg (215 lb)   SpO2 95%   BMI 27.60 kg/m²   Body mass index is 27.6 kg/m².    Gen: Awake, alert, no acute distress, sitting up and resting in bed  Head: Normocephalic, atraumatic. "   Eyes: Pupils equal, round, react to light and accommodation.   Mouth: Oral mucosa without lesions,   Neck: No masses, lymphadenopathy or carotid bruits bilaterally   CV: Rhythm and rate regular, no murmurs, rubs or gallops  Lungs: Clear to auscultation bilaterally, not labored on room air   Abdomen: Soft, generalized tenderness only to deep palpation in the upper abdomen, no rebound tenderness or guarding, certainly no peritoneal signs, incisions are healing well, not distended  Groin : No obvious hernias bilaterally   Extremities:  No cyanosis, clubbing or edema bilaterally  Lymphatics: No abnormal lymphadenopathy appreciated   Neurologic: No gross deficits         CBC  Results from last 7 days   Lab Units 08/22/21  0937   WBC 10*3/mm3 14.15*   HEMOGLOBIN g/dL 16.5   HEMATOCRIT % 46.8   PLATELETS 10*3/mm3 334       CMP  Results from last 7 days   Lab Units 08/22/21  0937   SODIUM mmol/L 134*   POTASSIUM mmol/L 3.7   CHLORIDE mmol/L 95*   CO2 mmol/L 23.0   BUN mg/dL 24*   CREATININE mg/dL 0.99   CALCIUM mg/dL 9.5   BILIRUBIN mg/dL 2.4*   ALK PHOS U/L 99   ALT (SGPT) U/L 25   AST (SGOT) U/L 16   GLUCOSE mg/dL 153*       Radiology  Imaging Results (Last 72 Hours)     Procedure Component Value Units Date/Time    XR Chest 1 View [[id number]] Collected: 08/22/21 1115     Updated: 08/22/21 1121    Narrative:      EXAMINATION: XR CHEST 1 VW-      INDICATION: fever 3 day, 10 days post op      COMPARISON: 8/11/2021     FINDINGS: Chronic changes are present, similar to comparison with some  presumed scattered atelectasis. There is otherwise no new focal lobar  consolidation. No significant pleural effusion or distinct pneumothorax.  Unchanged heart and mediastinal contours.       Impression:      Stable chronic changes from prior exams, without evidence of  acute airspace disease or other acute cardiopulmonary abnormality.     This report was finalized on 8/22/2021 11:18 AM by Florian Gregg.       CT Abdomen Pelvis With  Contrast [[id number]] Collected: 08/22/21 1030     Updated: 08/22/21 1040    Narrative:      EXAMINATION: CT ABDOMEN PELVIS W CONTRAST-      INDICATION: fever 10 days post op abd surgery     TECHNIQUE: Axial IV contrast enhanced CT of the abdomen and pelvis with  multiplanar reconstruction     The radiation dose reduction device was turned on for each scan per the  ALARA (As Low as Reasonably Achievable) protocol.     COMPARISON: NONE     FINDINGS: There is mild left basilar atelectasis and trace effusion. The  body wall soft tissues demonstrate some minimal persistent superficial  edema along the presumed incision site. Evaluation of the GE junction  demonstrates extraluminal foci of air and fluid compatible with leak  following fundoplication. Small and large bowel loops are otherwise  nondilated. There is no suspicious focal bowel wall thickening. The  appendix is normal. The liver, spleen, pancreas and bilateral adrenal  glands demonstrate homogeneous enhancement without evidence of  suspicious focal lesion. The kidneys demonstrate symmetric nephrogram  and contrast excretion without evidence of hydronephrosis or contour  deforming mass. Nonaneurysmal mildly atherosclerotic abdominal aorta. No  bulky retroperitoneal adenopathy. Colonic diverticulosis changes are  present without evidence of diverticulitis. The pelvic viscera are  unremarkable.       Impression:      Postoperative changes from recent fundoplication. There is  extraluminal air and fluid surrounding the operative site compatible  with leak/dehiscence. No additional acute findings in the abdomen and  pelvis.     Findings discussed with Dr. Salas of the emergency Department by  Florian Gregg via phone at 10:39 AM 8/22/2021     This report was finalized on 8/22/2021 10:36 AM by Florian Gregg.                 Results Review: I have personally reviewed all of the recent lab and imaging results available at this time.     Assessment:  Mr. Alicea   is a 66-year-old gentleman with history of arthritis, COPD, GERD, hypertension, obstructive sleep apnea, and thoracic aortic aneurysm who underwent laparoscopic hiatal hernia repair with mesh and Nissen fundoplication on August 10 with Dr. Mcgee who presents to [facility] emergency department with complaints of fever.  He is afebrile with stable vital signs in the emergency department.  His lab work-up demonstrates only a mild leukocytosis of 14,000, which is up only from 13 after surgery, and otherwise without acute findings and notable for a normal lactate of 1.8.  I have personally reviewed his CT scan of the abdomen and pelvis, this demonstrates some thickening and stranding surrounding the gastroesophageal junction as would be expected at this point after his surgery.  There are some small locules of air at the level of the gastroesophageal junction as well, however there is no evidence of significant mediastinal infection or pleural effusion, and there is no significant free fluid or free air within the abdomen.  He has only very mild tenderness to deep palpation in the upper abdomen and clinically appears stable.  At this point, hard to tell how much of this is expected postop changes versus the possibility of a small leak at his operative site.  Given his fevers, I think we are obligated to more definitively rule out a leak.  I have discussed with radiology will plan to obtain an upper GI today.  We will keep strict n.p.o. for now, continue antibiotics, and fluid resuscitation.    #Postoperative Fever, Concern for fluid collection surrounding recent fundoplication site   -Admit to telemetry  -CXR negative. UA with <10 WBC  -Keep strict n.p.o.  -IV Zosyn   -IV fluids  -BID IV PPI  -Discussed with radiology Dr. Gregg and will plan to obtain upper GI today to evaluate for leak      #COPD  -Order home meds  -Supplemental oxygen as needed    #Hypertension  -We will convert home beta-blocker to  IV  -Hold HCTZ and lisinopril      I discussed the patient's findings and my recommendations with the patient and/or family    Babak Guan MD  08/22/21  12:02 EDT      Upper GI reviewed and shows edematous wrap but contrast passes distally with no evidence of contrast extravasation or evidence of leak. Will go ahead and restart some of his necessary home medications by mouth, but will otherwise leave him NPO and leave antibiotics on until I review his case with Dr. Mcgee tomorrow     Electronically signed by Babak Guan MD, 08/22/21, 3:53 PM EDT.

## 2021-08-23 LAB
ANION GAP SERPL CALCULATED.3IONS-SCNC: 13 MMOL/L (ref 5–15)
BASOPHILS # BLD AUTO: 0.04 10*3/MM3 (ref 0–0.2)
BASOPHILS NFR BLD AUTO: 0.3 % (ref 0–1.5)
BUN SERPL-MCNC: 19 MG/DL (ref 8–23)
BUN/CREAT SERPL: 22.6 (ref 7–25)
CALCIUM SPEC-SCNC: 8.9 MG/DL (ref 8.6–10.5)
CHLORIDE SERPL-SCNC: 98 MMOL/L (ref 98–107)
CO2 SERPL-SCNC: 25 MMOL/L (ref 22–29)
CREAT SERPL-MCNC: 0.84 MG/DL (ref 0.76–1.27)
DEPRECATED RDW RBC AUTO: 45.3 FL (ref 37–54)
EOSINOPHIL # BLD AUTO: 0.04 10*3/MM3 (ref 0–0.4)
EOSINOPHIL NFR BLD AUTO: 0.3 % (ref 0.3–6.2)
ERYTHROCYTE [DISTWIDTH] IN BLOOD BY AUTOMATED COUNT: 13 % (ref 12.3–15.4)
GFR SERPL CREATININE-BSD FRML MDRD: 111 ML/MIN/1.73
GFR SERPL CREATININE-BSD FRML MDRD: 92 ML/MIN/1.73
GLUCOSE SERPL-MCNC: 106 MG/DL (ref 65–99)
HCT VFR BLD AUTO: 44.9 % (ref 37.5–51)
HGB BLD-MCNC: 15.1 G/DL (ref 13–17.7)
IMM GRANULOCYTES # BLD AUTO: 0.05 10*3/MM3 (ref 0–0.05)
IMM GRANULOCYTES NFR BLD AUTO: 0.4 % (ref 0–0.5)
LYMPHOCYTES # BLD AUTO: 1.49 10*3/MM3 (ref 0.7–3.1)
LYMPHOCYTES NFR BLD AUTO: 12.8 % (ref 19.6–45.3)
MCH RBC QN AUTO: 32.1 PG (ref 26.6–33)
MCHC RBC AUTO-ENTMCNC: 33.6 G/DL (ref 31.5–35.7)
MCV RBC AUTO: 95.3 FL (ref 79–97)
MONOCYTES # BLD AUTO: 0.88 10*3/MM3 (ref 0.1–0.9)
MONOCYTES NFR BLD AUTO: 7.6 % (ref 5–12)
NEUTROPHILS NFR BLD AUTO: 78.6 % (ref 42.7–76)
NEUTROPHILS NFR BLD AUTO: 9.1 10*3/MM3 (ref 1.7–7)
NRBC BLD AUTO-RTO: 0 /100 WBC (ref 0–0.2)
PLATELET # BLD AUTO: 312 10*3/MM3 (ref 140–450)
PMV BLD AUTO: 11.2 FL (ref 6–12)
POTASSIUM SERPL-SCNC: 3.5 MMOL/L (ref 3.5–5.2)
RBC # BLD AUTO: 4.71 10*6/MM3 (ref 4.14–5.8)
SODIUM SERPL-SCNC: 136 MMOL/L (ref 136–145)
WBC # BLD AUTO: 11.6 10*3/MM3 (ref 3.4–10.8)

## 2021-08-23 PROCEDURE — 96376 TX/PRO/DX INJ SAME DRUG ADON: CPT

## 2021-08-23 PROCEDURE — 80048 BASIC METABOLIC PNL TOTAL CA: CPT | Performed by: SURGERY

## 2021-08-23 PROCEDURE — 96366 THER/PROPH/DIAG IV INF ADDON: CPT

## 2021-08-23 PROCEDURE — 25010000002 PIPERACILLIN SOD-TAZOBACTAM PER 1 G: Performed by: SURGERY

## 2021-08-23 PROCEDURE — 96372 THER/PROPH/DIAG INJ SC/IM: CPT

## 2021-08-23 PROCEDURE — 85025 COMPLETE CBC W/AUTO DIFF WBC: CPT | Performed by: SURGERY

## 2021-08-23 PROCEDURE — 96361 HYDRATE IV INFUSION ADD-ON: CPT

## 2021-08-23 PROCEDURE — G0378 HOSPITAL OBSERVATION PER HR: HCPCS

## 2021-08-23 PROCEDURE — 25010000002 HEPARIN (PORCINE) PER 1000 UNITS: Performed by: SURGERY

## 2021-08-23 RX ORDER — GABAPENTIN 300 MG/1
600 CAPSULE ORAL EVERY 8 HOURS SCHEDULED
Status: DISCONTINUED | OUTPATIENT
Start: 2021-08-23 | End: 2021-08-24 | Stop reason: HOSPADM

## 2021-08-23 RX ORDER — DOCUSATE SODIUM 50 MG/5 ML
100 LIQUID (ML) ORAL DAILY
Status: DISCONTINUED | OUTPATIENT
Start: 2021-08-23 | End: 2021-08-24 | Stop reason: HOSPADM

## 2021-08-23 RX ORDER — PROMETHAZINE HYDROCHLORIDE 6.25 MG/5ML
12.5 SYRUP ORAL EVERY 6 HOURS PRN
Status: DISCONTINUED | OUTPATIENT
Start: 2021-08-23 | End: 2021-08-24 | Stop reason: HOSPADM

## 2021-08-23 RX ADMIN — BUPROPION HYDROCHLORIDE 150 MG: 150 TABLET, FILM COATED, EXTENDED RELEASE ORAL at 08:50

## 2021-08-23 RX ADMIN — AMITRIPTYLINE HYDROCHLORIDE 25 MG: 25 TABLET, FILM COATED ORAL at 21:09

## 2021-08-23 RX ADMIN — TAZOBACTAM SODIUM AND PIPERACILLIN SODIUM 3.38 G: 375; 3 INJECTION, SOLUTION INTRAVENOUS at 21:48

## 2021-08-23 RX ADMIN — METOPROLOL SUCCINATE 25 MG: 25 TABLET, EXTENDED RELEASE ORAL at 08:50

## 2021-08-23 RX ADMIN — GABAPENTIN 600 MG: 300 CAPSULE ORAL at 05:31

## 2021-08-23 RX ADMIN — PANTOPRAZOLE SODIUM 40 MG: 40 INJECTION, POWDER, FOR SOLUTION INTRAVENOUS at 08:49

## 2021-08-23 RX ADMIN — MONTELUKAST SODIUM 10 MG: 10 TABLET, COATED ORAL at 21:09

## 2021-08-23 RX ADMIN — TAZOBACTAM SODIUM AND PIPERACILLIN SODIUM 3.38 G: 375; 3 INJECTION, SOLUTION INTRAVENOUS at 05:31

## 2021-08-23 RX ADMIN — PANTOPRAZOLE SODIUM 40 MG: 40 INJECTION, POWDER, FOR SOLUTION INTRAVENOUS at 18:13

## 2021-08-23 RX ADMIN — HEPARIN SODIUM 5000 UNITS: 5000 INJECTION, SOLUTION INTRAVENOUS; SUBCUTANEOUS at 21:09

## 2021-08-23 RX ADMIN — GABAPENTIN 600 MG: 300 CAPSULE ORAL at 21:09

## 2021-08-23 RX ADMIN — GABAPENTIN 600 MG: 300 CAPSULE ORAL at 15:10

## 2021-08-23 RX ADMIN — SODIUM CHLORIDE, POTASSIUM CHLORIDE, SODIUM LACTATE AND CALCIUM CHLORIDE 100 ML/HR: 600; 310; 30; 20 INJECTION, SOLUTION INTRAVENOUS at 01:12

## 2021-08-23 RX ADMIN — DOCUSATE SODIUM 100 MG: 50 LIQUID ORAL at 08:50

## 2021-08-23 RX ADMIN — ROPINIROLE HYDROCHLORIDE 5 MG: 2 TABLET, FILM COATED ORAL at 21:09

## 2021-08-23 RX ADMIN — HEPARIN SODIUM 5000 UNITS: 5000 INJECTION, SOLUTION INTRAVENOUS; SUBCUTANEOUS at 05:31

## 2021-08-23 RX ADMIN — HEPARIN SODIUM 5000 UNITS: 5000 INJECTION, SOLUTION INTRAVENOUS; SUBCUTANEOUS at 15:10

## 2021-08-23 RX ADMIN — TAZOBACTAM SODIUM AND PIPERACILLIN SODIUM 3.38 G: 375; 3 INJECTION, SOLUTION INTRAVENOUS at 15:11

## 2021-08-23 NOTE — CASE MANAGEMENT/SOCIAL WORK
Discharge Planning Assessment  [facility]     Patient Name: Wong Alicea  MRN: [id number]  Today's Date: 8/23/2021    Admit Date: 8/22/2021    Discharge Needs Assessment     Row Name 08/23/21 1600       Living Environment    Lives With  spouse Pt resides in [facility]    Name(s) of Who Lives With Patient  wifeFranklin salvador    Current Living Arrangements  home/apartment/condo    Primary Care Provided by  self    Provides Primary Care For  no one    Family Caregiver if Needed  spouse    Quality of Family Relationships  helpful;involved;supportive    Able to Return to Prior Arrangements  yes       Resource/Environmental Concerns    Resource/Environmental Concerns  none       Transition Planning    Patient/Family Anticipates Transition to  home with family    Patient/Family Anticipated Services at Transition  none    Transportation Anticipated  family or friend will provide       Discharge Needs Assessment    Readmission Within the Last 30 Days  no previous admission in last 30 days    Equipment Currently Used at Home  none    Concerns to be Addressed  denies needs/concerns at this time;discharge planning    Anticipated Changes Related to Illness  none    Equipment Needed After Discharge  none    Provided Post Acute Provider List?  N/A    Provided Post Acute Provider Quality & Resource List?  N/A        Discharge Plan     Row Name 08/23/21 1601       Plan    Plan  home    Patient/Family in Agreement with Plan  yes    Plan Comments  CM spoke with pt at bedside. Pt  recently had surgery and underwent laparoscopic hiatal hernia repair with mesh and Nissen fundoplication on August 10, returned with fever, n/v.  Pt resides in [facility] with his wife Yaneli. Pt is independent of adls. Pt has a Bipap and nebulizer machine he uses as needed. Pt is not current with home health or outpatient medical services. Pt denies having a living will or legal POA. Pt has received his COVID vaccines. Pt denies safety concerns within his  home.Pt confirms he has [employer] medicare, denies concerns or disruption in coverage. Pt has prescription drug coverage and denies issues obtaining or affording current medications. Pt plans to return home and denies needs at this time. Pt will have private tranportation by his wife.  CM will continue to follow.    Final Discharge Disposition Code  01 - home or self-care        Continued Care and Services - Admitted Since 8/22/2021    Coordination has not been started for this encounter.         Demographic Summary     Row Name 08/23/21 1558       General Information    Admission Type  observation    Referral Source  admission list    Reason for Consult  discharge planning    Preferred Language  English     Used During This Interaction  no    General Information Comments  PCP- Juliann Aguilar       Contact Information    Permission Granted to Share Info With      Contact Information Comments  [phone] (wife)        Functional Status     Row Name 08/23/21 1552       Functional Status    Usual Activity Tolerance  good    Current Activity Tolerance  moderate       Functional Status, IADL    Medications  independent    Meal Preparation  independent    Housekeeping  independent    Laundry  independent    Shopping  independent       Mental Status    General Appearance WDL  WDL       Mental Status Summary    Recent Changes in Mental Status/Cognitive Functioning  no changes       Employment/    Employment/ Comments  Pt confirms he has [employer] medicare, denies concerns or disruption in coverage. Pt has prescription drug coverage and denies issues obtaining or affording current medications.        Psychosocial    No documentation.       Abuse/Neglect    No documentation.       Legal    No documentation.       Substance Abuse    No documentation.       Patient Forms    No documentation.           Kelsey Mckeon RN

## 2021-08-23 NOTE — PROGRESS NOTES
"Patient Name:  Wong Alicea  YOB: 1955  [id number]    Surgery Progress Note    Date of visit: 8/23/2021    Subjective   Subjective: Feeling a little better this morning.  His temperature was lower overnight.  He would like to eat.         Objective     Objective:     /63 (BP Location: Left arm, Patient Position: Lying)   Pulse 74   Temp 100.3 °F (37.9 °C) (Oral)   Resp 18   Ht 188 cm (74\")   Wt 97.5 kg (215 lb)   SpO2 93%   BMI 27.60 kg/m²     Intake/Output Summary (Last 24 hours) at 8/23/2021 0712  Last data filed at 8/23/2021 0334  Gross per 24 hour   Intake 2766 ml   Output --   Net 2766 ml       CV:  Rhythm  regular and rate regular   L:  Clear  to auscultation bilaterally   Abd:  Bowel sounds positive , soft, nontender.  Incisions clean dry and intact.  Ext:  No cyanosis, clubbing, edema    Recent labs that are back at this time have been reviewed.  Laboratory exam this morning feels a white count of 11,000.  I have personally reviewed both the CT scan as well as the upper GI.Neither shows evidence of leak, and only postoperative changes.       Assessment/Plan     Assessment/ Plan:    Problem List Items Addressed This Visit        Symptoms and Signs    Complication of surgical procedure - Primary- I am unclear as to the cause of his fever, but all of his postoperative imaging appears appropriate given his recent operation.  I do think continuing antibiotics this point is reasonable.  We will decrease his IV fluids and continue with oral diet.  Hopefully home tomorrow on oral antibiotics.        Other Visit Diagnoses     Acute febrile illness               Active Hospital Problems    Diagnosis  POA   • Complication of surgical procedure [T81.9XXA]  Yes      Resolved Hospital Problems   No resolved problems to display.              Ralf Mcgee MD  8/23/2021  07:12 EDT      "

## 2021-08-23 NOTE — PROGRESS NOTES
"                  Clinical Nutrition     Reason for Visit: Identified at risk by screening criteria, MST score 2+    Patient Name: Wong Alicea  YOB: 1955  MRN: [id number]  Date of Encounter: 08/23/21 14:36 EDT  Admission date: 8/22/2021    Nutrition Assessment   Admission Problem List:    Complication of surgical procedure    Applicable PMH:  Post fundoplication surgery (8/10/21)     Applicable Nutrition-Related Information:  Post fundoplication diet instruction (8/11/21)       PMH:   He  has a past medical history of Aneurysm (CMS/HCC), Arthritis, Black lung disease (CMS/HCC), Cataract, COPD (chronic obstructive pulmonary disease) (CMS/HCC), Depression, Full dentures, GERD (gastroesophageal reflux disease), Hiatal hernia, Hypertension, Obstructive sleep apnea treated with BiPAP, Renal cyst, Sleep apnea, and Thoracic aortic aneurysm (CMS/HCC).  PSxH:  He  has a past surgical history that includes Cholecystectomy; Hand surgery; Replacement total knee; Carpal tunnel release; Shoulder surgery; Vein Laceration Repair; Esophageal motility study (N/A, 6/18/2021); gastric ph monitoring 24hr (N/A, 6/18/2021); Joint replacement; Colonoscopy (2019); Cataract extraction (Bilateral); Sinus surgery; and Nissen fundoplication (N/A, 8/10/2021).      Reported/Observed/Food/Nutrition Related History:     Patient reports he is familiar with diet progression. He was tolerating full liquid.  Tolertaing them now.  Was also drinking ONS; mix of both Boost and Ensure.  Some recent weight loss; anticipated with change in diet and diet progression after surgery.     Anthropometrics   Height: Height: 188 cm (74\")  Weight: Weight: 97.5 kg (215 lb) (08/22/21 0828)- bed weight obtained 222#   BMI: BMI (Calculated): 27.6  BMI classification: Overweight: 25.0-29.9kg/m2    IBW: 190    UBW: 230#   Weight change: ? 8# weight loss x 1 week- patient most likely euvolemic on admission.     Labs reviewed     Results from last 7 days   Lab " Units 08/23/21  0535 08/22/21  0937 08/22/21  0937   SODIUM mmol/L 136   < > 134*   POTASSIUM mmol/L 3.5   < > 3.7   CHLORIDE mmol/L 98   < > 95*   CO2 mmol/L 25.0   < > 23.0   BUN mg/dL 19   < > 24*   CREATININE mg/dL 0.84   < > 0.99   CALCIUM mg/dL 8.9   < > 9.5   BILIRUBIN mg/dL  --   --  2.4*   ALK PHOS U/L  --   --  99   ALT (SGPT) U/L  --   --  25   AST (SGOT) U/L  --   --  16   GLUCOSE mg/dL 106*   < > 153*    < > = values in this interval not displayed.           No results found for: HGBA1C    Medications reviewed   Pertinent: yes     Intake/Ouptut 24 hrs (7:00AM - 6:59 AM)     Intake & Output (last day)       08/22 0701 - 08/23 0700 08/23 0701 - 08/24 0700    P.O.  540    I.V. (mL/kg) 716 (7.3)     IV Piggyback 2050     Total Intake(mL/kg) 2766 (28.4) 540 (5.5)    Net +2766 +540          Urine Unmeasured Occurrence 3 x 1 x    Stool Unmeasured Occurrence 1 x 1 x          Current Nutrition Prescription     PO: Diet Full Liquid  Oral Nutrition Supplement:     Evaluation of Received Nutrient/Fluid Intake:  Insufficient data     Nutrition Diagnosis   Date: 8/23  Updated:   Problem Inadequate oral intake   Etiology Recent GI surgery with slow diet progression    Signs/Symptoms Liquid diet x 2 weeks after fundoplication    Status:   Nutrition Intervention   1.  Follow treatment progress, Care plan reviewed, Interview for preferences   2. Boost plus to Ensure HP for all meals.  3. Recommend Advance diet as tolerated to soft.  Tomorrow will be 2 weeks post op- typically when patient can advance to soft foods.       Goal:   General: Nutrition support treatment  PO: Establish PO, Meet estimated needs    Additional goals:    Monitoring/Evaluation:   Per protocol, PO intake, Supplement intake, Pertinent labs    Will Continue to follow per protocol      Alberta Goetz RD  Time Spent: 30min

## 2021-08-23 NOTE — PLAN OF CARE
Problem: Adult Inpatient Plan of Care  Goal: Absence of Hospital-Acquired Illness or Injury  Intervention: Identify and Manage Fall Risk  Description: Perform standard risk assessment with a validated tool or comprehensive approach appropriate to the patient on admission; reassess fall risk frequently, with change in status or transfer to another level of care.  Communicate fall injury risk to interprofessional healthcare team.  Determine need for increased observation, equipment and environmental modification, such as low bed and signage, as well as supportive, nonskid footwear.  Adjust safety measures to individual developmental age, stage and identified risk factors.  Reinforce the importance of safety and physical activity with patient and family.  Perform regular intentional rounding to assess need for position change, pain assessment, personal needs, including assistance with toileting.  Recent Flowsheet Documentation  Taken 8/23/2021 0000 by Alvaro Rivera, RN  Safety Promotion/Fall Prevention:   activity supervised   assistive device/personal items within reach   clutter free environment maintained   elopement precautions   fall prevention program maintained   nonskid shoes/slippers when out of bed   safety round/check completed  Taken 8/22/2021 2200 by Alvaro Rivera, RN  Safety Promotion/Fall Prevention:   activity supervised   assistive device/personal items within reach   clutter free environment maintained   elopement precautions   fall prevention program maintained   nonskid shoes/slippers when out of bed   safety round/check completed  Taken 8/22/2021 2000 by Alvaro Rivera, RN  Safety Promotion/Fall Prevention:   activity supervised   assistive device/personal items within reach   clutter free environment maintained   elopement precautions   fall prevention program maintained   nonskid shoes/slippers when out of bed   safety round/check completed  Intervention: Prevent Skin Injury  Description: Assess skin  risk on admission and at regular intervals throughout hospital stay.  Keep all areas of skin (especially folds) clean and dry.  Maintain adequate skin hydration.  Relieve and redistribute pressure and protect bony prominences; implement measures based on patient-specific risk factors.  Match turning and repositioning schedule to clinical condition.  Encourage weight shift frequently; assist with reposition if unable to complete independently.  Float heels off bed. Avoid pressure on the Achilles tendon.  Keep skin free from extended contact with medical devices.  Use aids (e.g., slide boards, mechanical lift) during transfer.  Recent Flowsheet Documentation  Taken 8/23/2021 0000 by Alvaro Rivera RN  Body Position: position changed independently  Taken 8/22/2021 2200 by Alvaro Rivera RN  Body Position: position changed independently  Taken 8/22/2021 2000 by Alvaro Rivera RN  Body Position: position changed independently  Intervention: Prevent and Manage VTE (venous thromboembolism) Risk  Description: Assess for VTE risk.  Encourage/assist with early ambulation.  Initiate and maintain compression or other therapy, as indicated based on identified risk in accordance with organizational protocol/provider order.  Encourage both active and passive leg exercises while in bed, if unable to ambulate.  Recent Flowsheet Documentation  Taken 8/22/2021 2000 by Alvaro Rivera RN  VTE Prevention/Management:   bilateral   dorsiflexion/plantar flexion performed  Intervention: Prevent Infection  Description: Maintain skin and mucous membrane integrity; promote hand, oral and pulmonary hygiene.  Optimize fluid balance, nutrition, sleep and glycemic control to maximize infection resistance.  Identify potential sources of infection early to prevent or mitigate progression of infection (e.g., wound, lines, devices).  Evaluate ongoing need for invasive devices; remove promptly when no longer indicated.  Recent Flowsheet Documentation  Taken  8/22/2021 2000 by Alvaro Rivera RN  Infection Prevention:   environmental surveillance performed   hand hygiene promoted   single patient room provided   rest/sleep promoted  Goal: Optimal Comfort and Wellbeing  Intervention: Provide Person-Centered Care  Description: Use a family-focused approach to care.  Develop trust and rapport by proactively providing information, encouraging questions, addressing concerns and offering reassurance.  Acknowledge emotional response to hospitalization.  Recognize and utilize personal coping strategies.  Honor spiritual and cultural preferences.  Recent Flowsheet Documentation  Taken 8/22/2021 2000 by Alvaro Rivera RN  Trust Relationship/Rapport:   care explained   choices provided   emotional support provided   empathic listening provided   questions answered   questions encouraged   reassurance provided   thoughts/feelings acknowledged     Problem: Pain Chronic (Persistent) (Comorbidity Management)  Goal: Acceptable Pain Control and Functional Ability  Outcome: Ongoing, Progressing  Intervention: Develop Pain Management Plan  Description: Acknowledge patient as the expert in pain self-management.  Use a consistent, validated tool for pain assessment.  Set pain management goals; determine acceptable level of discomfort to allow for maximal functioning and quality of life.  Determine [id number] pain management plan, including both pharmacologic and nonpharmacologic measures.  Identify and integrate past successful treatment measures, if able.  Encourage patient and caregiver involvement in pain assessment, interventions and safety measures.  Reevaluate plan regularly.  Flowsheets (Taken 8/23/2021 0056)  Pain Management Interventions:   care clustered   pain management plan reviewed with patient/caregiver   quiet environment facilitated   see MAR   relaxation techniques promoted  Intervention: Manage Persistent Pain  Description: Evaluate pain level, effect of treatment and  patient response at regular intervals.  Note: Response to pain may diminish over time. This does not indicate that pain is absent.  Minimize pain stimuli; coordinate care and adjust environment (e.g., light, noise, unnecessary movement); promote sleep/rest.  Match pharmacologic analgesia to severity and type of pain mechanism (e.g., neuropathic, muscle, inflammatory); consider multimodal approach (e.g., nonopioid, opioid, adjuvant).  Provide medication at regular intervals; titrate to patient response.  Manage breakthrough pain with additional doses; consider rotation or switching medication.  Monitor for signs of substance tolerance (increased dose to reach desired effect, decreased effect with same dose).  Avoid abrupt withdrawal of medication, especially agents capable of causing physical dependence.  Manage medication-induced effects, such as constipation, nausea, urinary retention, somnolence and dizziness.  Consider nonpharmacologic pain interventions to improve function (e.g., massage, transcutaneous electrical nerve stimulation, vibration, heat or cold application, immobilization, hydrotherapy).  Consider addition of complementary or alternative therapy, such as acupuncture, hypnosis or therapeutic touch.  Flowsheets  Taken 8/23/2021 0056  Bowel Elimination Promotion:   ambulation promoted   privacy promoted   adequate fluid intake promoted  Sleep/Rest Enhancement:   awakenings minimized   reading promoted   regular sleep/rest pattern promoted   relaxation techniques promoted  Taken 8/22/2021 2000  Medication Review/Management:   medications reviewed   high-risk medications identified  Intervention: Optimize Psychosocial Wellbeing  Description: Facilitate patient’s self-control over pain by providing pain information and allowing choices in treatment.  Consider and address emotional response to pain.  Explore and promote use of coping strategies; address barriers to successful coping.  Evaluate and assist  with psychosocial, cultural and spiritual factors impacting pain.  Modify pain perception by using cognitive-behavioral techniques, such as distraction, guided imagery, meditation, relaxation or music.  Flowsheets  Taken 8/23/2021 0056  Supportive Measures:   active listening utilized   positive reinforcement provided   problem-solving facilitated   relaxation techniques promoted   self-care encouraged   verbalization of feelings encouraged   self-responsibility promoted   self-reflection promoted  Taken 8/22/2021 2000  Diversional Activities:   television   smartphone   Goal Outcome Evaluation:

## 2021-08-24 VITALS
OXYGEN SATURATION: 96 % | WEIGHT: 215 LBS | RESPIRATION RATE: 18 BRPM | TEMPERATURE: 98 F | HEIGHT: 74 IN | DIASTOLIC BLOOD PRESSURE: 77 MMHG | HEART RATE: 85 BPM | SYSTOLIC BLOOD PRESSURE: 131 MMHG | BODY MASS INDEX: 27.59 KG/M2

## 2021-08-24 LAB
ANION GAP SERPL CALCULATED.3IONS-SCNC: 13 MMOL/L (ref 5–15)
BUN SERPL-MCNC: 11 MG/DL (ref 8–23)
BUN/CREAT SERPL: 13.8 (ref 7–25)
CALCIUM SPEC-SCNC: 9 MG/DL (ref 8.6–10.5)
CHLORIDE SERPL-SCNC: 101 MMOL/L (ref 98–107)
CO2 SERPL-SCNC: 26 MMOL/L (ref 22–29)
CREAT SERPL-MCNC: 0.8 MG/DL (ref 0.76–1.27)
DEPRECATED RDW RBC AUTO: 45.1 FL (ref 37–54)
ERYTHROCYTE [DISTWIDTH] IN BLOOD BY AUTOMATED COUNT: 13.2 % (ref 12.3–15.4)
GFR SERPL CREATININE-BSD FRML MDRD: 118 ML/MIN/1.73
GFR SERPL CREATININE-BSD FRML MDRD: 97 ML/MIN/1.73
GLUCOSE SERPL-MCNC: 105 MG/DL (ref 65–99)
HCT VFR BLD AUTO: 45 % (ref 37.5–51)
HGB BLD-MCNC: 15.3 G/DL (ref 13–17.7)
MCH RBC QN AUTO: 31.9 PG (ref 26.6–33)
MCHC RBC AUTO-ENTMCNC: 34 G/DL (ref 31.5–35.7)
MCV RBC AUTO: 93.8 FL (ref 79–97)
PLATELET # BLD AUTO: 362 10*3/MM3 (ref 140–450)
PMV BLD AUTO: 10.7 FL (ref 6–12)
POTASSIUM SERPL-SCNC: 3.6 MMOL/L (ref 3.5–5.2)
RBC # BLD AUTO: 4.8 10*6/MM3 (ref 4.14–5.8)
SODIUM SERPL-SCNC: 140 MMOL/L (ref 136–145)
WBC # BLD AUTO: 10.74 10*3/MM3 (ref 3.4–10.8)

## 2021-08-24 PROCEDURE — 80048 BASIC METABOLIC PNL TOTAL CA: CPT | Performed by: SURGERY

## 2021-08-24 PROCEDURE — 96376 TX/PRO/DX INJ SAME DRUG ADON: CPT

## 2021-08-24 PROCEDURE — 96366 THER/PROPH/DIAG IV INF ADDON: CPT

## 2021-08-24 PROCEDURE — 85027 COMPLETE CBC AUTOMATED: CPT | Performed by: SURGERY

## 2021-08-24 PROCEDURE — 25010000002 PIPERACILLIN SOD-TAZOBACTAM PER 1 G: Performed by: SURGERY

## 2021-08-24 PROCEDURE — 25010000002 HEPARIN (PORCINE) PER 1000 UNITS: Performed by: SURGERY

## 2021-08-24 PROCEDURE — 96372 THER/PROPH/DIAG INJ SC/IM: CPT

## 2021-08-24 PROCEDURE — 94799 UNLISTED PULMONARY SVC/PX: CPT

## 2021-08-24 PROCEDURE — G0378 HOSPITAL OBSERVATION PER HR: HCPCS

## 2021-08-24 RX ORDER — AMOXICILLIN AND CLAVULANATE POTASSIUM 500; 125 MG/1; MG/1
1 TABLET, FILM COATED ORAL 3 TIMES DAILY
Qty: 21 TABLET | Refills: 0 | Status: SHIPPED | OUTPATIENT
Start: 2021-08-24

## 2021-08-24 RX ADMIN — BUDESONIDE AND FORMOTEROL FUMARATE DIHYDRATE 2 PUFF: 80; 4.5 AEROSOL RESPIRATORY (INHALATION) at 07:07

## 2021-08-24 RX ADMIN — GABAPENTIN 600 MG: 300 CAPSULE ORAL at 05:44

## 2021-08-24 RX ADMIN — HEPARIN SODIUM 5000 UNITS: 5000 INJECTION, SOLUTION INTRAVENOUS; SUBCUTANEOUS at 05:43

## 2021-08-24 RX ADMIN — TAZOBACTAM SODIUM AND PIPERACILLIN SODIUM 3.38 G: 375; 3 INJECTION, SOLUTION INTRAVENOUS at 05:44

## 2021-08-24 RX ADMIN — PANTOPRAZOLE SODIUM 40 MG: 40 INJECTION, POWDER, FOR SOLUTION INTRAVENOUS at 05:43

## 2021-08-24 NOTE — PLAN OF CARE
Goal Outcome Evaluation:  Plan of Care Reviewed With: patient        Progress: no change  Outcome Summary: VSS on room air. Pt has had no complaints so far this shift. Will continue to monitor. 0230 8/24/2021

## 2021-08-24 NOTE — PLAN OF CARE
Goal Outcome Evaluation:  Plan of Care Reviewed With: patient           Outcome Summary: : Patient impatient to leave. took off tele leads, pulled IV without recieving half of zosyn. Unable to complete assessment.

## 2021-08-24 NOTE — PROGRESS NOTES
"Patient Name:  Wong Alicea  YOB: 1955  [id number]    Surgery Progress Note    Date of visit: 8/24/2021    Subjective   Subjective: Feeling much better, no pain, wants to go home.         Objective     Objective:     /77 (BP Location: Right arm, Patient Position: Lying)   Pulse 85   Temp 98 °F (36.7 °C) (Oral)   Resp 18   Ht 188 cm (74\")   Wt 97.5 kg (215 lb)   SpO2 96%   BMI 27.60 kg/m²     Intake/Output Summary (Last 24 hours) at 8/24/2021 0714  Last data filed at 8/24/2021 0611  Gross per 24 hour   Intake 1070 ml   Output 600 ml   Net 470 ml       CV:  Rhythm  regular and rate regular   L:  Clear  to auscultation bilaterally   Abd:  Bowel sounds positive , soft, nontender  Ext:  No cyanosis, clubbing, edema    Recent labs that are back at this time have been reviewed.  White blood cell count 10,000.       Assessment/Plan     Assessment/ Plan:    Problem List Items Addressed This Visit        Symptoms and Signs    Complication of surgical procedure - Primary- I not completely sure why he had a mild temperature, I suspect he did not get up and move around as he was supposed to.  There is no evidence of leak on either CT scan or upper GI.  We will discharge him home with oral antibiotics to complete a 7-day course.  Follow-up as scheduled in 2 weeks.      Other Visit Diagnoses     Acute febrile illness               Active Hospital Problems    Diagnosis  POA   • Complication of surgical procedure [T81.9XXA]  Yes      Resolved Hospital Problems   No resolved problems to display.              Ralf Mcgee MD  8/24/2021  07:14 EDT      "

## 2021-08-24 NOTE — DISCHARGE SUMMARY
Discharge Summary    Patient Name:  Wong Alicea  YOB: 1955  [id number]      DATE OF ADMISSION: 8/22/2021    DATE OF DISCHARGE: 8/24/2021       FINAL DIAGNOSES:   Patient Active Problem List   Diagnosis   • Thoracic aortic aneurysm without rupture (CMS/HCC)   • Interstitial lung disease (CMS/HCC)   • Hiatal hernia   • GERD (gastroesophageal reflux disease)   • Hypertension   • Hyperlipidemia   • Paraesophageal hernia   • Complication of surgical procedure       CONSULTING SERVICES: None      PROCEDURES PERFORMED:   None    HISTORY: The patient is a very pleasant 66 y.o. male with a history of recent hiatal hernia repair with Nissen fundoplication.  He developed fevers at home and presented to the ER.  Subsequent evaluation was nonspecific and he was admitted to the floor for IV antibiotics..      HOSPITAL COURSE: He was admitted to the floor and started on IV antibiotics.  His fever broke, and he continued to improve.  Imaging did not reveal a clear source of fever, and his upper GI was otherwise normal.  He continued to improve, tolerated diet, and was ready for discharge home on 8/24/2021.     CONDITION: At the time of discharge, the patient is tolerating a post-fundoplication soft diet without difficulty. he is having normal bowel and bladder function. his incisions are clean, dry and intact.  He is no longer febrile.     DISCHARGE MEDICATIONS:   1. All previous home medications.   2.  Augmentin 500 mg p.o. 3 times daily x7 days     DISCHARGE INSTRUCTIONS:  1. The patient is instructed to follow up with Dr. Mcgee in 2 weeks’ time.  2. he is instructed to refrain from lifting more than 15 or 20 pounds until their return to clinic.   3. If the patient has worsening problems with fever or chills nausea or vomiting he is to contact Dr. Mcgee's office and a contact card has been provided.        Ralf Mcgee MD  8/24/2021  07:16 EDT

## 2022-05-03 DIAGNOSIS — I71.20 THORACIC AORTIC ANEURYSM WITHOUT RUPTURE: Primary | ICD-10-CM

## 2022-08-03 ENCOUNTER — TELEPHONE (OUTPATIENT)
Dept: CARDIAC SURGERY | Facility: CLINIC | Age: 67
End: 2022-08-03

## 2022-08-03 NOTE — TELEPHONE ENCOUNTER
Caller: [facility]    Relationship: SELF    Best call back number: [phone]    What orders are you requesting: CT OF CHEST OVERDUE FOR 1 YEAR FOLLOW UP AND NEEDS PRIOR TO APT. PLEASE SEND TO [facility] FOR PATIENT TO COMPLETE.     PATIENT WOULD LIKE TO BE CALLED ONCE ORDER HAS BEEN SENT.

## 2022-08-26 DIAGNOSIS — I71.20 THORACIC AORTIC ANEURYSM WITHOUT RUPTURE: ICD-10-CM

## 2022-09-02 ENCOUNTER — TELEPHONE (OUTPATIENT)
Dept: CARDIAC SURGERY | Facility: CLINIC | Age: 67
End: 2022-09-02

## 2022-09-02 ENCOUNTER — OFFICE VISIT (OUTPATIENT)
Dept: CARDIAC SURGERY | Facility: CLINIC | Age: 67
End: 2022-09-02

## 2022-09-02 DIAGNOSIS — I71.20 THORACIC AORTIC ANEURYSM WITHOUT RUPTURE: Primary | ICD-10-CM

## 2022-09-02 PROCEDURE — 99442 PR PHYS/QHP TELEPHONE EVALUATION 11-20 MIN: CPT | Performed by: NURSE PRACTITIONER

## 2022-09-02 NOTE — PROGRESS NOTES
[facility] Cardiothoracic Surgery Office Follow Up Note     Date of Encounter: 2022     Name: Wong Alicea  : 1955     Referred By: No ref. provider found  PCP: Juliann Aguilar APRN    Chief Complaint:    Chief Complaint   Patient presents with   • Aortic Aneurysm     1 year follow up with CT chest for TAA      You have chosen to receive care through a telephone visit. Do you consent to use a telephone visit for your medical care today? Yes  Subjective      History of Present Illness:    Wong Alicea is a 67 y.o. male current smoker, with a history of hyperlipidemia, interstitial lung disease (longtime ), and ascending thoracic aortic aneurysm followed by Dr. Martin since  (was 4.2 cm at that time).  Patient was last seen in clinic 2021 for surveillance of his aneurysm with stable CT chest demonstrating 4.3 cm ascending thoracic aortic aneurysm with chronic interstitial and pleural-based nodular densities.  Patient presents today for 1 year follow-up.  Patient denies any acute chest pain, back pain, or shoulder pain.  He reports his blood pressure is typically well controlled at home running in the 130s/70s.    Review of Systems:  Review of Systems   Constitutional: Positive for malaise/fatigue. Negative for chills, decreased appetite, diaphoresis, fever, night sweats, weight gain and weight loss.   HENT: Negative for hoarse voice.    Eyes: Negative for blurred vision, double vision and visual disturbance.   Cardiovascular: Positive for dyspnea on exertion and orthopnea. Negative for chest pain, claudication, irregular heartbeat, leg swelling, near-syncope, palpitations, paroxysmal nocturnal dyspnea and syncope.   Respiratory: Negative for cough, hemoptysis, shortness of breath, sputum production and wheezing.    Hematologic/Lymphatic: Negative for adenopathy and bleeding problem. Does not bruise/bleed easily.   Skin: Negative for color change, nail changes, poor wound  healing and rash.   Musculoskeletal: Negative for back pain, falls and muscle cramps.   Gastrointestinal: Negative for abdominal pain, dysphagia and heartburn.   Genitourinary: Negative for flank pain.   Neurological: Negative for brief paralysis, disturbances in coordination, dizziness, focal weakness, headaches, light-headedness, loss of balance, numbness, paresthesias, sensory change, vertigo and weakness.   Psychiatric/Behavioral: Negative for depression and suicidal ideas.   Allergic/Immunologic: Negative for persistent infections.       I have reviewed the following portions of the patient's history: allergies, current medications, past family history, past medical history, past social history, past surgical history, problem list and ROS and confirm it's accurate.    Allergies:  Allergies   Allergen Reactions   • Other    • Codeine Anxiety   • Erythromycin Rash   • Niacin Rash   • Sulfa Antibiotics Rash   • Wheat Bran Rash       Medications:      Current Outpatient Medications:   •  amitriptyline (ELAVIL) 25 MG tablet, Take 25 mg by mouth every night at bedtime. *Patient takes PRN, Disp: , Rfl:   •  budesonide-formoterol (SYMBICORT) 80-4.5 MCG/ACT inhaler, Inhale 1 puff 2 (two) times a day., Disp: , Rfl:   •  buPROPion XL (WELLBUTRIN XL) 150 MG 24 hr tablet, Take 150 mg by mouth Daily. *Patient takes PRN, Disp: , Rfl:   •  Camphor-Menthol-Capsicum (TIGER BALM PAIN RELIEVING EX), Apply  topically As Needed., Disp: , Rfl:   •  ciclesonide (OMNARIS) 50 MCG/ACT nasal spray, 2 sprays by Each Nare route Every Night., Disp: , Rfl:   •  diclofenac (VOLTAREN) 75 MG EC tablet, Take 75 mg by mouth 2 (Two) Times a Day., Disp: , Rfl:   •  docusate sodium (COLACE) 150 MG/15ML liquid, Take 10 mL by mouth Daily., Disp: 200 mL, Rfl: 0  •  ezetimibe (ZETIA) 10 MG tablet, Take 10 mg by mouth Daily., Disp: , Rfl:   •  gabapentin (NEURONTIN) 600 MG tablet, Take 600 mg by mouth 3 (Three) Times a Day., Disp: , Rfl:   •  Gel Base  gel, Apply  topically to the appropriate area as directed As Needed. Biofreeze gel, Disp: , Rfl:   •  hydroCHLOROthiazide (HYDRODIURIL) 12.5 MG tablet, Take 12.5 mg by mouth Every Morning., Disp: , Rfl:   •  HYDROcodone-acetaminophen (HYCET) 7.5-325 MG/15ML solution, Take 15 mL by mouth Every 4 (Four) Hours As Needed for Moderate Pain . 8/12 for up to 9 days, patient taking PRN, Disp: , Rfl:   •  levocetirizine (XYZAL) 5 MG tablet, Take 5 mg by mouth Every Evening., Disp: , Rfl:   •  lidocaine (LIDODERM) 5 %, Place 1 patch on the skin as directed by provider As Needed for Mild Pain  (back)., Disp: , Rfl:   •  Magnesium 400 MG tablet, Take 400 mg by mouth Daily., Disp: , Rfl:   •  metoprolol succinate XL (TOPROL-XL) 25 MG 24 hr tablet, Take 25 mg by mouth Daily., Disp: , Rfl:   •  montelukast (SINGULAIR) 10 MG tablet, Take 10 mg by mouth Every Night., Disp: , Rfl:   •  promethazine (PHENERGAN) 6.25 MG/5ML syrup, Take 10 mL by mouth Every 6 (Six) Hours As Needed for Nausea or Vomiting., Disp: 300 mL, Rfl: 0  •  rOPINIRole (REQUIP) 5 MG tablet, Take 5 mg by mouth 2 (Two) Times a Day. *Patient takes PRN, Disp: , Rfl:   •  tiZANidine (ZANAFLEX) 4 MG tablet, Take 4 mg by mouth 2 (two) times a day., Disp: , Rfl:   •  TURMERIC PO, Take  by mouth Daily. OTC, Pt unsure of strength, Disp: , Rfl:   •  vitamin D (ERGOCALCIFEROL) 1.25 MG (03689 UT) capsule capsule, Take 50,000 Units by mouth 1 (One) Time Per Week. On sundays, Disp: , Rfl:   •  amoxicillin-clavulanate (Augmentin) 500-125 MG per tablet, Take 1 tablet by mouth 3 (Three) Times a Day., Disp: 21 tablet, Rfl: 0    History:   Past Medical History:   Diagnosis Date   • Aneurysm (HCC)    • Arthritis    • Black lung disease (HCC)    • Cataract    • COPD (chronic obstructive pulmonary disease) ([facility])    • Depression    • Full dentures    • GERD (gastroesophageal reflux disease)    • Hiatal hernia    • Hypertension    • Obstructive sleep apnea treated with BiPAP    • Renal  cyst    • Sleep apnea    • Thoracic aortic aneurysm (HCC)        Past Surgical History:   Procedure Laterality Date   • CARPAL TUNNEL RELEASE     • CATARACT EXTRACTION Bilateral     cataract surgery    • CHOLECYSTECTOMY     • COLONOSCOPY  2019   • ESOPHAGEAL MOTILITY STUDY N/A 6/18/2021    Procedure: MANOMETRY/PH;  Surgeon: Ralf Mcgee MD;  Location:  [facility];  Service: General;  Laterality: N/A;  procedure completed successfully.   • GASTRIC PH MONITORING 24HR N/A 6/18/2021    Procedure: PH;  Surgeon: Ralf Mcgee MD;  Location:  [facility];  Service: General;  Laterality: N/A;  Probe passed successfully; secured @ 34 cm. LES @ 43.8 cm.   • HAND SURGERY     • JOINT REPLACEMENT     • NISSEN FUNDOPLICATION N/A 8/10/2021    Procedure: NISSEN LAPAROSCOPIC WITH EGD;  Surgeon: Ralf Mcgee MD;  Location:  [facility];  Service: General;  Laterality: N/A;   • REPLACEMENT TOTAL KNEE     • SHOULDER SURGERY     • SINUS SURGERY     • VEIN LACERATION REPAIR      OPEN LEFT KNEE SURGERY FOR VEIN REPAIR       Social History     Socioeconomic History   • Marital status:    • Number of children: 3   Tobacco Use   • Smoking status: Current Every Day Smoker     Packs/day: 0.50     Years: 20.00     Pack years: 10.00     Types: Cigars   • Smokeless tobacco: Former User     Types: Chew     Quit date: 1998   • Tobacco comment: smoking 8 cigars a day   Vaping Use   • Vaping Use: Never used   Substance and Sexual Activity   • Alcohol use: No   • Drug use: No   • Sexual activity: Defer        Family History   Problem Relation Age of Onset   • Heart failure Mother    • Coronary artery disease Father    • Lung disease Father        Objective     Physical Exam:  There were no vitals filed for this visit.   There is no height or weight on file to calculate BMI.    Physical Exam  Constitutional:       General: He is awake.   Neurological:      Mental Status: He is alert.   Psychiatric:         Behavior:  Behavior is cooperative.         Imaging/Labs:    CT Lumbar Spine Without Contrast    Result Date: 8/26/2022  Lumbar spondylosis and spinal stenosis as described above.  There is relatively mild broad-based disc bulging at L2-L3 and L3-L4.  There is more significant disc bulging L4-L5 and there is a focal disc herniation at that level on the right which is filled with gas.  This lesion indents the right ventral aspect of the thecal sac. THIS DOCUMENT HAS BEEN ELECTRONICALLY SIGNED BY OSVALDO MARRERO MD    CT chest w and wo IV contrast    Result Date: 8/26/2022  Mild dilatation of the ascending thoracic aorta to maximal transverse diameter of 4.3 cm, unchanged. Additional chronic/nonacute findings as detailed, stable in appearance compared to prior chest CT dated 6/14/2021. Report Sign Date: 8/26/2022 1:25 PM, Electronically Signed By: Scottie Cano MD           Assessment / Plan      Assessment / Plan:  Diagnoses and all orders for this visit:    1. Thoracic aortic aneurysm without rupture (HCC) (Primary)       1. Ascending thoracic aortic aneurysm followed by Dr. Martin since 2015 (was 4.2 cm at that time):  Patient denies any acute chest pain, back pain, or shoulder pain.  He reports his blood pressure is typically well controlled at home running in the 130s/70s.  Discussed findings of CT chest with stable ascending aortic aneurysm at 4.3 cm with continued noting of chronic predominantly subcentimeter nodularity distributed throughout both lungs.  Due to patient having stable size of ascending aortic aneurysm since 2015, will plan to repeat imaging with CTA chest with in 18 months for continued surveillance.  He does unfortunately continue to smoke and I counseled him on the importance of cessation.  Encourage patient to continue good blood pressure control.      Patient Education:  Wong Alicea  reports that he has been smoking cigars. He has a 10.00 pack-year smoking history. He quit smokeless tobacco use about 24  years ago.  His smokeless tobacco use included chew.. I have educated him on the risk of diseases from using tobacco products such as cancer, COPD and heart disease.     I advised him to quit and he is not willing to quit.    I spent 4 minutes counseling the patient.      Follow Up:   Return in about 18 months (around 3/2/2024) for F/u with imaging.   Or sooner for any further concerns or worsening sign and symptoms. If unable to reach us in the office please dial 911 or go to the nearest emergency department.      Iva STEWARD  [facility] Cardiothoracic Surgery    This visit has been rescheduled as a phone visit to comply with patient safety concerns in accordance with CDC recommendations. Total time of discussion was 15 minutes.

## 2024-02-06 DIAGNOSIS — I71.20 THORACIC AORTIC ANEURYSM WITHOUT RUPTURE, UNSPECIFIED PART: Primary | ICD-10-CM

## (undated) DEVICE — HLDR TBG NG

## (undated) DEVICE — ENDOPATH XCEL UNIVERSAL TROCAR STABLILITY SLEEVES: Brand: ENDOPATH XCEL

## (undated) DEVICE — PATIENT RETURN ELECTRODE, SINGLE-USE, CONTACT QUALITY MONITORING, ADULT, WITH 9FT CORD, FOR PATIENTS WEIGING OVER 33LBS. (15KG): Brand: MEGADYNE

## (undated) DEVICE — GOWN,PREVENTION PLUS,XXLARGE,STERILE: Brand: MEDLINE

## (undated) DEVICE — PDS II VLT 0 107CM AG ST3: Brand: ENDOLOOP

## (undated) DEVICE — GLV SURG SENSICARE PI MIC PF SZ7 LF STRL

## (undated) DEVICE — ENDOPATH XCEL BLADELESS TROCARS WITH STABILITY SLEEVES: Brand: ENDOPATH XCEL

## (undated) DEVICE — PENROSE DRAIN 18 X .5" SILICONE: Brand: MEDLINE

## (undated) DEVICE — PK SOL VISC ESOPH 80ML

## (undated) DEVICE — INCISIONLINE PLEDGET SFT 22X1 2.75MM

## (undated) DEVICE — PROB ESOPH COMFORTEC IMPEDANCE NON/INF 6F 2.3 18CM

## (undated) DEVICE — APPL CHLORAPREP TINTED 26ML TEAL

## (undated) DEVICE — GLV SURG SENSICARE PI MIC PF SZ7.5 LF STRL

## (undated) DEVICE — MARYLAND JAW LAPAROSCOPIC SEALER/DIVIDER COATED: Brand: LIGASURE

## (undated) DEVICE — ANTIBACTERIAL UNDYED BRAIDED (POLYGLACTIN 910), SYNTHETIC ABSORBABLE SUTURE: Brand: COATED VICRYL

## (undated) DEVICE — [HIGH FLOW INSUFFLATOR,  DO NOT USE IF PACKAGE IS DAMAGED,  KEEP DRY,  KEEP AWAY FROM SUNLIGHT,  PROTECT FROM HEAT AND RADIOACTIVE SOURCES.]: Brand: PNEUMOSURE

## (undated) DEVICE — CVR HNDL LIGHT RIGID

## (undated) DEVICE — INTENDED USE FOR SURGICAL MARKING ON INTACT SKIN, ALSO PROVIDES A PERMANENT METHOD OF IDENTIFYING OBJECTS IN THE OPERATING ROOM: Brand: WRITESITE® REGULAR TIP SKIN MARKER

## (undated) DEVICE — JELLY,LUBE,STERILE,FLIP TOP,TUBE,2-OZ: Brand: MEDLINE

## (undated) DEVICE — SUT SILK 2/0 SH 30IN K833H

## (undated) DEVICE — SUT SILK 0 SH 30IN K834H

## (undated) DEVICE — PK LAP LASR CHOLE 10

## (undated) DEVICE — ENDOCUT SCISSOR TIP, DISPOSABLE: Brand: RENEW

## (undated) DEVICE — 30977 SEE SHARP - ENHANCED INTRAOPERATIVE LAPAROSCOPE CLEANING & DEFOGGING: Brand: 30977 SEE SHARP - ENHANCED INTRAOPERATIVE LAPAROSCOPE CLEANING & DEFOGGING